# Patient Record
Sex: MALE | Race: OTHER | Employment: FULL TIME | URBAN - METROPOLITAN AREA
[De-identification: names, ages, dates, MRNs, and addresses within clinical notes are randomized per-mention and may not be internally consistent; named-entity substitution may affect disease eponyms.]

---

## 2022-02-12 ENCOUNTER — HOSPITAL ENCOUNTER (EMERGENCY)
Age: 42
Discharge: HOME | End: 2022-02-12
Attending: EMERGENCY MEDICINE

## 2022-02-12 ENCOUNTER — APPOINTMENT (OUTPATIENT)
Dept: GENERAL RADIOLOGY | Age: 42
End: 2022-02-12
Attending: EMERGENCY MEDICINE

## 2022-02-12 ENCOUNTER — APPOINTMENT (OUTPATIENT)
Dept: CT IMAGING | Age: 42
End: 2022-02-12
Attending: EMERGENCY MEDICINE

## 2022-02-12 VITALS
BODY MASS INDEX: 27 KG/M2 | HEART RATE: 88 BPM | HEIGHT: 67 IN | SYSTOLIC BLOOD PRESSURE: 109 MMHG | DIASTOLIC BLOOD PRESSURE: 62 MMHG | WEIGHT: 172 LBS | TEMPERATURE: 98 F | OXYGEN SATURATION: 100 % | RESPIRATION RATE: 16 BRPM

## 2022-02-12 DIAGNOSIS — R55 SYNCOPE AND COLLAPSE: Primary | ICD-10-CM

## 2022-02-12 DIAGNOSIS — K29.00 ACUTE GASTRITIS WITHOUT HEMORRHAGE, UNSPECIFIED GASTRITIS TYPE: ICD-10-CM

## 2022-02-12 DIAGNOSIS — E86.0 DEHYDRATION: ICD-10-CM

## 2022-02-12 DIAGNOSIS — R10.9 ACUTE ABDOMINAL PAIN: ICD-10-CM

## 2022-02-12 LAB
ALBUMIN SERPL-MCNC: 3.6 G/DL (ref 3.5–5)
ALBUMIN/GLOB SERPL: 1.1 {RATIO} (ref 1.2–3.5)
ALP SERPL-CCNC: 65 U/L (ref 50–136)
ALT SERPL-CCNC: 39 U/L (ref 12–65)
ANION GAP SERPL CALC-SCNC: 6 MMOL/L (ref 7–16)
AST SERPL-CCNC: 20 U/L (ref 15–37)
ATRIAL RATE: 73 BPM
BASOPHILS # BLD: 0.1 K/UL (ref 0–0.2)
BASOPHILS NFR BLD: 1 % (ref 0–2)
BILIRUB SERPL-MCNC: 0.4 MG/DL (ref 0.2–1.1)
BUN SERPL-MCNC: 25 MG/DL (ref 6–23)
CALCIUM SERPL-MCNC: 8.4 MG/DL (ref 8.3–10.4)
CALCULATED P AXIS, ECG09: 64 DEGREES
CALCULATED R AXIS, ECG10: 47 DEGREES
CALCULATED T AXIS, ECG11: 13 DEGREES
CHLORIDE SERPL-SCNC: 109 MMOL/L (ref 98–107)
CO2 SERPL-SCNC: 26 MMOL/L (ref 21–32)
CREAT SERPL-MCNC: 0.82 MG/DL (ref 0.8–1.5)
DIAGNOSIS, 93000: NORMAL
DIFFERENTIAL METHOD BLD: ABNORMAL
EOSINOPHIL # BLD: 0.1 K/UL (ref 0–0.8)
EOSINOPHIL NFR BLD: 1 % (ref 0.5–7.8)
ERYTHROCYTE [DISTWIDTH] IN BLOOD BY AUTOMATED COUNT: 13 % (ref 11.9–14.6)
GLOBULIN SER CALC-MCNC: 3.2 G/DL (ref 2.3–3.5)
GLUCOSE SERPL-MCNC: 137 MG/DL (ref 65–100)
HCT VFR BLD AUTO: 32.3 % (ref 41.1–50.3)
HGB BLD-MCNC: 10.9 G/DL (ref 13.6–17.2)
IMM GRANULOCYTES # BLD AUTO: 0.1 K/UL (ref 0–0.5)
IMM GRANULOCYTES NFR BLD AUTO: 1 % (ref 0–5)
LIPASE SERPL-CCNC: 193 U/L (ref 73–393)
LYMPHOCYTES # BLD: 2.8 K/UL (ref 0.5–4.6)
LYMPHOCYTES NFR BLD: 26 % (ref 13–44)
MCH RBC QN AUTO: 30.3 PG (ref 26.1–32.9)
MCHC RBC AUTO-ENTMCNC: 33.7 G/DL (ref 31.4–35)
MCV RBC AUTO: 89.7 FL (ref 79.6–97.8)
MONOCYTES # BLD: 0.7 K/UL (ref 0.1–1.3)
MONOCYTES NFR BLD: 7 % (ref 4–12)
NEUTS SEG # BLD: 7 K/UL (ref 1.7–8.2)
NEUTS SEG NFR BLD: 65 % (ref 43–78)
NRBC # BLD: 0 K/UL (ref 0–0.2)
P-R INTERVAL, ECG05: 268 MS
PLATELET # BLD AUTO: 303 K/UL (ref 150–450)
PMV BLD AUTO: 9.1 FL (ref 9.4–12.3)
POTASSIUM SERPL-SCNC: 4.3 MMOL/L (ref 3.5–5.1)
PROT SERPL-MCNC: 6.8 G/DL (ref 6.3–8.2)
Q-T INTERVAL, ECG07: 394 MS
QRS DURATION, ECG06: 86 MS
QTC CALCULATION (BEZET), ECG08: 434 MS
RBC # BLD AUTO: 3.6 M/UL (ref 4.23–5.6)
SODIUM SERPL-SCNC: 141 MMOL/L (ref 136–145)
VENTRICULAR RATE, ECG03: 73 BPM
WBC # BLD AUTO: 10.8 K/UL (ref 4.3–11.1)

## 2022-02-12 PROCEDURE — 80053 COMPREHEN METABOLIC PANEL: CPT

## 2022-02-12 PROCEDURE — 96374 THER/PROPH/DIAG INJ IV PUSH: CPT

## 2022-02-12 PROCEDURE — 94762 N-INVAS EAR/PLS OXIMTRY CONT: CPT

## 2022-02-12 PROCEDURE — 99285 EMERGENCY DEPT VISIT HI MDM: CPT

## 2022-02-12 PROCEDURE — 71045 X-RAY EXAM CHEST 1 VIEW: CPT

## 2022-02-12 PROCEDURE — 96375 TX/PRO/DX INJ NEW DRUG ADDON: CPT

## 2022-02-12 PROCEDURE — 83690 ASSAY OF LIPASE: CPT

## 2022-02-12 PROCEDURE — 70450 CT HEAD/BRAIN W/O DYE: CPT

## 2022-02-12 PROCEDURE — 74011000250 HC RX REV CODE- 250: Performed by: EMERGENCY MEDICINE

## 2022-02-12 PROCEDURE — 93005 ELECTROCARDIOGRAM TRACING: CPT | Performed by: EMERGENCY MEDICINE

## 2022-02-12 PROCEDURE — 83735 ASSAY OF MAGNESIUM: CPT

## 2022-02-12 PROCEDURE — 74011250636 HC RX REV CODE- 250/636: Performed by: EMERGENCY MEDICINE

## 2022-02-12 PROCEDURE — 85025 COMPLETE CBC W/AUTO DIFF WBC: CPT

## 2022-02-12 RX ORDER — HYDROMORPHONE HYDROCHLORIDE 1 MG/ML
0.5 INJECTION, SOLUTION INTRAMUSCULAR; INTRAVENOUS; SUBCUTANEOUS ONCE
Status: COMPLETED | OUTPATIENT
Start: 2022-02-12 | End: 2022-02-12

## 2022-02-12 RX ORDER — OMEPRAZOLE 20 MG/1
20 CAPSULE, DELAYED RELEASE ORAL DAILY
Qty: 30 CAPSULE | Refills: 2 | Status: SHIPPED | OUTPATIENT
Start: 2022-02-12 | End: 2022-02-16

## 2022-02-12 RX ORDER — ONDANSETRON 4 MG/1
4 TABLET, ORALLY DISINTEGRATING ORAL
Qty: 10 TABLET | Refills: 0 | Status: SHIPPED | OUTPATIENT
Start: 2022-02-12

## 2022-02-12 RX ORDER — SODIUM CHLORIDE 0.9 % (FLUSH) 0.9 %
5-10 SYRINGE (ML) INJECTION EVERY 8 HOURS
Status: DISCONTINUED | OUTPATIENT
Start: 2022-02-12 | End: 2022-02-12 | Stop reason: HOSPADM

## 2022-02-12 RX ORDER — ONDANSETRON 2 MG/ML
4 INJECTION INTRAMUSCULAR; INTRAVENOUS
Status: COMPLETED | OUTPATIENT
Start: 2022-02-12 | End: 2022-02-12

## 2022-02-12 RX ORDER — SODIUM CHLORIDE 0.9 % (FLUSH) 0.9 %
5-10 SYRINGE (ML) INJECTION AS NEEDED
Status: DISCONTINUED | OUTPATIENT
Start: 2022-02-12 | End: 2022-02-12 | Stop reason: HOSPADM

## 2022-02-12 RX ADMIN — SODIUM CHLORIDE 1000 ML: 900 INJECTION, SOLUTION INTRAVENOUS at 13:22

## 2022-02-12 RX ADMIN — FAMOTIDINE 20 MG: 10 INJECTION, SOLUTION INTRAVENOUS at 10:40

## 2022-02-12 RX ADMIN — HYDROMORPHONE HYDROCHLORIDE 0.5 MG: 1 INJECTION, SOLUTION INTRAMUSCULAR; INTRAVENOUS; SUBCUTANEOUS at 10:33

## 2022-02-12 RX ADMIN — SODIUM CHLORIDE 12.5 MG: 9 INJECTION INTRAMUSCULAR; INTRAVENOUS; SUBCUTANEOUS at 11:21

## 2022-02-12 RX ADMIN — ONDANSETRON 4 MG: 2 INJECTION INTRAMUSCULAR; INTRAVENOUS at 10:40

## 2022-02-12 NOTE — ED NOTES
Patient advises that he had got up this morning and went to take a shower when he started with abdominal pain then dizzy with witnessed syncopal episode. Patient advises wife helped him up and then he started sweating and has a headache. Patient also with nausea denies any vomiting at this time. Masked. Dr. Benji Anthony advised.

## 2022-02-12 NOTE — ED NOTES
I have reviewed discharge instructions with the patient and spouse. The patient and spouse verbalized understanding. Patient left ED via Discharge Method: ambulatory to Home with [facility]. Opportunity for questions and clarification provided. Patient given 2 scripts. To continue your aftercare when you leave the hospital, you may receive an automated call from our care team to check in on how you are doing. This is a free service and part of our promise to provide the best care and service to meet your aftercare needs.  If you have questions, or wish to unsubscribe from this service please call [phone]. Thank you for Choosing our [facility] Emergency Department.

## 2022-02-12 NOTE — ED PROVIDER NOTES
Per nurses notes: \"Patient advises that he had got up this morning and went to take a shower when he started with abdominal pain then dizzy with witnessed syncopal episode. Patient advises wife helped him up and then he started sweating and has a headache. Patient also with nausea denies any vomiting at this time. Masked. Dr. Jorge Moreno advised. \"    The patient had started some medication of his wife's for constipation approximately 3 days ago. He denies any melena or hematochezia. Headache is described as generalized and throbbing and denies any neck pain, visual changes, paralysis or paresthesias. Dizziness is much worse with movement and improved with sitting still. The history is provided by the patient. The history is limited by a language barrier. A  was used. Syncope   This is a new problem. The current episode started less than 1 hour ago. The problem occurs rarely. The problem has been resolved. He lost consciousness for a period of less than one minute. The problem is associated with standing up. Associated symptoms include abdominal pain, nausea, dizziness and light-headedness. Pertinent negatives include no visual change, no chest pain, no palpitations, no clumsiness, no confusion, no fever, no malaise/fatigue, no bowel incontinence, no vomiting, no bladder incontinence, no congestion, no headaches, no back pain, no focal weakness, no seizures, no slurred speech, no weakness, no melena, no anal bleeding and no head injury. He has tried bed rest for the symptoms. The treatment provided mild relief. His past medical history does not include no CVA, no TIA, no CAD, no DM, no HTN, no vertigo, no seizures, no syncope or no AICD. No past medical history on file. No past surgical history on file. No family history on file.     Social History     Socioeconomic History    Marital status:      Spouse name: Not on file    Number of children: Not on file    Years of education: Not on file    Highest education level: Not on file   Occupational History    Not on file   Tobacco Use    Smoking status: Not on file    Smokeless tobacco: Not on file   Substance and Sexual Activity    Alcohol use: Not on file    Drug use: Not on file    Sexual activity: Not on file   Other Topics Concern    Not on file   Social History Narrative    Not on file     Social Determinants of Health     Financial Resource Strain:     Difficulty of Paying Living Expenses: Not on file   Food Insecurity:     Worried About Running Out of Food in the Last Year: Not on file    Leah of Food in the Last Year: Not on file   Transportation Needs:     Lack of Transportation (Medical): Not on file    Lack of Transportation (Non-Medical): Not on file   Physical Activity:     Days of Exercise per Week: Not on file    Minutes of Exercise per Session: Not on file   Stress:     Feeling of Stress : Not on file   Social Connections:     Frequency of Communication with Friends and Family: Not on file    Frequency of Social Gatherings with Friends and Family: Not on file    Attends Yazdanism Services: Not on file    Active Member of [address] [place] [address] or Organizations: Not on file    Attends Club or Organization Meetings: Not on file    Marital Status: Not on file   Intimate Partner Violence:     Fear of Current or Ex-Partner: Not on file    Emotionally Abused: Not on file    Physically Abused: Not on file    Sexually Abused: Not on file   Housing Stability:     Unable to Pay for Housing in the Last Year: Not on file    Number of [place] in the Last Year: Not on file    Unstable Housing in the Last Year: Not on file         ALLERGIES: Patient has no known allergies. Review of Systems   Constitutional: Negative for appetite change, chills, fever and malaise/fatigue. HENT: Negative for congestion. Cardiovascular: Positive for syncope. Negative for chest pain and palpitations.    Gastrointestinal: Positive for abdominal pain, constipation and nausea. Negative for anal bleeding, blood in stool, bowel incontinence, diarrhea, melena and vomiting. Genitourinary: Negative for bladder incontinence, dysuria and frequency. Musculoskeletal: Negative for back pain. Neurological: Positive for dizziness and light-headedness. Negative for focal weakness, seizures, syncope, facial asymmetry, speech difficulty, weakness, numbness and headaches. Psychiatric/Behavioral: Negative for confusion. All other systems reviewed and are negative. Vitals:    02/12/22 1015   BP: 124/74   Pulse: 73   Resp: 16   Temp: 97.7 °F (36.5 °C)   SpO2: 100%   Weight: 78 kg (172 lb)   Height: 5' 6.93\" (1.7 m)            Physical Exam  Vitals and nursing note reviewed. Constitutional:       General: He is in acute distress (mild). Appearance: He is well-developed. HENT:      Head: Normocephalic and atraumatic. Right Ear: External ear normal.      Left Ear: External ear normal.   Eyes:      General: No scleral icterus. Extraocular Movements: Extraocular movements intact. Conjunctiva/sclera: Conjunctivae normal.      Pupils: Pupils are equal, round, and reactive to light. Comments: No evidence of nystagmus   Cardiovascular:      Rate and Rhythm: Normal rate and regular rhythm. Heart sounds: Normal heart sounds. No murmur heard. Pulmonary:      Effort: Pulmonary effort is normal.      Breath sounds: Normal breath sounds. Abdominal:      General: Abdomen is flat. Bowel sounds are normal.      Palpations: Abdomen is soft. There is no shifting dullness, hepatomegaly, splenomegaly, mass or pulsatile mass. Tenderness: There is abdominal tenderness (mild) in the epigastric area. There is no right CVA tenderness, left CVA tenderness, guarding or rebound. Negative signs include Colbert's sign and McBurney's sign. Hernia: No hernia is present.    Musculoskeletal:         General: Normal range of motion. Cervical back: Normal range of motion and neck supple. Skin:     General: Skin is warm and dry. Capillary Refill: Capillary refill takes less than 2 seconds. Neurological:      Mental Status: He is alert and oriented to person, place, and time. Cranial Nerves: Cranial nerves are intact. Sensory: Sensation is intact. Motor: Motor function is intact. Coordination: Coordination is intact. Psychiatric:         Mood and Affect: Mood normal.         Behavior: Behavior normal.          MDM  Number of Diagnoses or Management Options  Acute abdominal pain: new and requires workup  Acute gastritis without hemorrhage, unspecified gastritis type: new and requires workup  Dehydration: new and requires workup  Syncope and collapse: new and requires workup     Amount and/or Complexity of Data Reviewed  Clinical lab tests: ordered  Tests in the radiology section of CPT®: ordered  Tests in the medicine section of CPT®: ordered and reviewed (ECG interpretation for ECG dated 12 February 22 at 10:17 AM: ECG reveals normal sinus rhythm with first-degree AV block at a rate of 73 bpm, normal WA and QTc intervals with no evidence of acute ectopy or ischemia. Normal ECG except for first-degree block. Tacho Irvin MD  )  Review and summarize past medical records: yes      ED Course as of 02/12/22 1446   Sat Feb 12, 2022   1444 Patient states headache resolved and feeling much better.  [BB]      ED Course User Index  [BB] Nila Alexander MD       Procedures      Results Reviewed:      Recent Results (from the past 24 hour(s))   EKG, 12 LEAD, INITIAL    Collection Time: 02/12/22 10:17 AM   Result Value Ref Range    Ventricular Rate 73 BPM    Atrial Rate 73 BPM    P-R Interval 268 ms    QRS Duration 86 ms    Q-T Interval 394 ms    QTC Calculation (Bezet) 434 ms    Calculated P Axis 64 degrees    Calculated R Axis 47 degrees    Calculated T Axis 13 degrees    Diagnosis       Sinus rhythm with 1st degree A-V block  Otherwise normal ECG    Confirmed by Karl Mcknight ([id number]) on 2/12/2022 11:53:02 AM     CBC WITH AUTOMATED DIFF    Collection Time: 02/12/22 10:22 AM   Result Value Ref Range    WBC 10.8 4.3 - 11.1 K/uL    RBC 3.60 (L) 4.23 - 5.6 M/uL    HGB 10.9 (L) 13.6 - 17.2 g/dL    HCT 32.3 (L) 41.1 - 50.3 %    MCV 89.7 79.6 - 97.8 FL    MCH 30.3 26.1 - 32.9 PG    MCHC 33.7 31.4 - 35.0 g/dL    RDW 13.0 11.9 - 14.6 %    PLATELET 593 545 - 139 K/uL    MPV 9.1 (L) 9.4 - 12.3 FL    ABSOLUTE NRBC 0.00 0.0 - 0.2 K/uL    DF AUTOMATED      NEUTROPHILS 65 43 - 78 %    LYMPHOCYTES 26 13 - 44 %    MONOCYTES 7 4.0 - 12.0 %    EOSINOPHILS 1 0.5 - 7.8 %    BASOPHILS 1 0.0 - 2.0 %    IMMATURE GRANULOCYTES 1 0.0 - 5.0 %    ABS. NEUTROPHILS 7.0 1.7 - 8.2 K/UL    ABS. LYMPHOCYTES 2.8 0.5 - 4.6 K/UL    ABS. MONOCYTES 0.7 0.1 - 1.3 K/UL    ABS. EOSINOPHILS 0.1 0.0 - 0.8 K/UL    ABS. BASOPHILS 0.1 0.0 - 0.2 K/UL    ABS. IMM. GRANS. 0.1 0.0 - 0.5 K/UL   METABOLIC PANEL, COMPREHENSIVE    Collection Time: 02/12/22 10:22 AM   Result Value Ref Range    Sodium 141 136 - 145 mmol/L    Potassium 4.3 3.5 - 5.1 mmol/L    Chloride 109 (H) 98 - 107 mmol/L    CO2 26 21 - 32 mmol/L    Anion gap 6 (L) 7 - 16 mmol/L    Glucose 137 (H) 65 - 100 mg/dL    BUN 25 (H) 6 - 23 MG/DL    Creatinine 0.82 0.8 - 1.5 MG/DL    GFR est AA >60 >60 ml/min/1.73m2    GFR est non-AA >60 >60 ml/min/1.73m2    Calcium 8.4 8.3 - 10.4 MG/DL    Bilirubin, total 0.4 0.2 - 1.1 MG/DL    ALT (SGPT) 39 12 - 65 U/L    AST (SGOT) 20 15 - 37 U/L    Alk. phosphatase 65 50 - 136 U/L    Protein, total 6.8 6.3 - 8.2 g/dL    Albumin 3.6 3.5 - 5.0 g/dL    Globulin 3.2 2.3 - 3.5 g/dL    A-G Ratio 1.1 (L) 1.2 - 3.5     LIPASE    Collection Time: 02/12/22 10:22 AM   Result Value Ref Range    Lipase 193 73 - 393 U/L     CT HEAD WO CONT   Final Result   Tiny benign-appearing parenchymal calcifications may be vascular.       XR CHEST PORT   Final Result   Lungs very underexpanded but felt to be clear. I discussed the results of all labs, procedures, radiographs, and treatments with the patient and available family. Treatment plan is agreed upon and the patient is ready for discharge. All voiced understanding of the discharge plan and medication instructions or changes as appropriate. Questions about treatment in the ED were answered. All were encouraged to return should symptoms worsen or new problems develop.

## 2022-02-15 ENCOUNTER — HOSPITAL ENCOUNTER (INPATIENT)
Age: 42
LOS: 1 days | Discharge: HOME | DRG: 812 | End: 2022-02-16
Attending: EMERGENCY MEDICINE | Admitting: INTERNAL MEDICINE

## 2022-02-15 ENCOUNTER — ANESTHESIA EVENT (OUTPATIENT)
Dept: ENDOSCOPY | Age: 42
DRG: 812 | End: 2022-02-15

## 2022-02-15 ENCOUNTER — ANESTHESIA (OUTPATIENT)
Dept: ENDOSCOPY | Age: 42
DRG: 812 | End: 2022-02-15

## 2022-02-15 DIAGNOSIS — R55 SYNCOPE AND COLLAPSE: ICD-10-CM

## 2022-02-15 DIAGNOSIS — K92.2 ACUTE UPPER GI BLEED: Primary | ICD-10-CM

## 2022-02-15 DIAGNOSIS — D64.9 SYMPTOMATIC ANEMIA: ICD-10-CM

## 2022-02-15 PROBLEM — D62 ACUTE BLOOD LOSS ANEMIA: Status: ACTIVE | Noted: 2022-02-15

## 2022-02-15 PROBLEM — K92.1 MELENA: Status: ACTIVE | Noted: 2022-02-15

## 2022-02-15 LAB
ALBUMIN SERPL-MCNC: 3.1 G/DL (ref 3.5–5)
ALBUMIN/GLOB SERPL: 1.1 {RATIO} (ref 1.2–3.5)
ALP SERPL-CCNC: 54 U/L (ref 50–136)
ALT SERPL-CCNC: 33 U/L (ref 12–65)
ANION GAP SERPL CALC-SCNC: 3 MMOL/L (ref 7–16)
AST SERPL-CCNC: 19 U/L (ref 15–37)
BASOPHILS # BLD: 0 K/UL (ref 0–0.2)
BASOPHILS NFR BLD: 0 % (ref 0–2)
BILIRUB SERPL-MCNC: 0.2 MG/DL (ref 0.2–1.1)
BUN SERPL-MCNC: 30 MG/DL (ref 6–23)
CALCIUM SERPL-MCNC: 8.2 MG/DL (ref 8.3–10.4)
CHLORIDE SERPL-SCNC: 110 MMOL/L (ref 98–107)
CO2 SERPL-SCNC: 27 MMOL/L (ref 21–32)
COVID-19 RAPID TEST, COVR: NOT DETECTED
CREAT SERPL-MCNC: 0.86 MG/DL (ref 0.8–1.5)
DIFFERENTIAL METHOD BLD: ABNORMAL
EOSINOPHIL # BLD: 0 K/UL (ref 0–0.8)
EOSINOPHIL NFR BLD: 0 % (ref 0.5–7.8)
ERYTHROCYTE [DISTWIDTH] IN BLOOD BY AUTOMATED COUNT: 14.2 % (ref 11.9–14.6)
GLOBULIN SER CALC-MCNC: 2.9 G/DL (ref 2.3–3.5)
GLUCOSE SERPL-MCNC: 134 MG/DL (ref 65–100)
HCT VFR BLD AUTO: 22.2 % (ref 41.1–50.3)
HEMOCCULT STL QL: POSITIVE
HGB BLD-MCNC: 7.4 G/DL (ref 13.6–17.2)
HISTORY CHECKED?,CKHIST: NORMAL
IMM GRANULOCYTES # BLD AUTO: 0.3 K/UL (ref 0–0.5)
IMM GRANULOCYTES NFR BLD AUTO: 3 % (ref 0–5)
INR PPP: 1
LACTATE SERPL-SCNC: 1.3 MMOL/L (ref 0.4–2)
LIPASE SERPL-CCNC: 214 U/L (ref 73–393)
LYMPHOCYTES # BLD: 2 K/UL (ref 0.5–4.6)
LYMPHOCYTES NFR BLD: 17 % (ref 13–44)
MAGNESIUM SERPL-MCNC: 1.9 MG/DL (ref 1.6–2.3)
MAGNESIUM SERPL-MCNC: 2.2 MG/DL (ref 1.8–2.4)
MCH RBC QN AUTO: 31.1 PG (ref 26.1–32.9)
MCHC RBC AUTO-ENTMCNC: 33.3 G/DL (ref 31.4–35)
MCV RBC AUTO: 93.3 FL (ref 79.6–97.8)
MONOCYTES # BLD: 0.7 K/UL (ref 0.1–1.3)
MONOCYTES NFR BLD: 6 % (ref 4–12)
NEUTS SEG # BLD: 8.4 K/UL (ref 1.7–8.2)
NEUTS SEG NFR BLD: 73 % (ref 43–78)
NRBC # BLD: 0.02 K/UL (ref 0–0.2)
PLATELET # BLD AUTO: 289 K/UL (ref 150–450)
PMV BLD AUTO: 9.1 FL (ref 9.4–12.3)
POTASSIUM SERPL-SCNC: 4.3 MMOL/L (ref 3.5–5.1)
PROT SERPL-MCNC: 6 G/DL (ref 6.3–8.2)
PROTHROMBIN TIME: 13.6 SEC (ref 12.6–14.5)
RBC # BLD AUTO: 2.38 M/UL (ref 4.23–5.6)
SODIUM SERPL-SCNC: 140 MMOL/L (ref 136–145)
SOURCE, COVRS: NORMAL
WBC # BLD AUTO: 11.4 K/UL (ref 4.3–11.1)

## 2022-02-15 PROCEDURE — 82270 OCCULT BLOOD FECES: CPT

## 2022-02-15 PROCEDURE — 93005 ELECTROCARDIOGRAM TRACING: CPT | Performed by: EMERGENCY MEDICINE

## 2022-02-15 PROCEDURE — C9113 INJ PANTOPRAZOLE SODIUM, VIA: HCPCS | Performed by: EMERGENCY MEDICINE

## 2022-02-15 PROCEDURE — 77030040361 HC SLV COMPR DVT MDII -B

## 2022-02-15 PROCEDURE — 76040000025: Performed by: INTERNAL MEDICINE

## 2022-02-15 PROCEDURE — 83735 ASSAY OF MAGNESIUM: CPT

## 2022-02-15 PROCEDURE — 74011250636 HC RX REV CODE- 250/636: Performed by: NURSE ANESTHETIST, CERTIFIED REGISTERED

## 2022-02-15 PROCEDURE — 99284 EMERGENCY DEPT VISIT MOD MDM: CPT

## 2022-02-15 PROCEDURE — 86923 COMPATIBILITY TEST ELECTRIC: CPT

## 2022-02-15 PROCEDURE — 96374 THER/PROPH/DIAG INJ IV PUSH: CPT

## 2022-02-15 PROCEDURE — 65270000029 HC RM PRIVATE

## 2022-02-15 PROCEDURE — 83690 ASSAY OF LIPASE: CPT

## 2022-02-15 PROCEDURE — 2709999900 HC NON-CHARGEABLE SUPPLY: Performed by: INTERNAL MEDICINE

## 2022-02-15 PROCEDURE — 74011000250 HC RX REV CODE- 250: Performed by: EMERGENCY MEDICINE

## 2022-02-15 PROCEDURE — 96361 HYDRATE IV INFUSION ADD-ON: CPT

## 2022-02-15 PROCEDURE — 36430 TRANSFUSION BLD/BLD COMPNT: CPT

## 2022-02-15 PROCEDURE — 83605 ASSAY OF LACTIC ACID: CPT

## 2022-02-15 PROCEDURE — 85610 PROTHROMBIN TIME: CPT

## 2022-02-15 PROCEDURE — 85025 COMPLETE CBC W/AUTO DIFF WBC: CPT

## 2022-02-15 PROCEDURE — 2709999900 HC NON-CHARGEABLE SUPPLY

## 2022-02-15 PROCEDURE — 80053 COMPREHEN METABOLIC PANEL: CPT

## 2022-02-15 PROCEDURE — 0DJ08ZZ INSPECTION OF UPPER INTESTINAL TRACT, VIA NATURAL OR ARTIFICIAL OPENING ENDOSCOPIC: ICD-10-PCS | Performed by: INTERNAL MEDICINE

## 2022-02-15 PROCEDURE — 74011250637 HC RX REV CODE- 250/637: Performed by: PHYSICIAN ASSISTANT

## 2022-02-15 PROCEDURE — 74011250636 HC RX REV CODE- 250/636: Performed by: INTERNAL MEDICINE

## 2022-02-15 PROCEDURE — 93005 ELECTROCARDIOGRAM TRACING: CPT | Performed by: STUDENT IN AN ORGANIZED HEALTH CARE EDUCATION/TRAINING PROGRAM

## 2022-02-15 PROCEDURE — 87635 SARS-COV-2 COVID-19 AMP PRB: CPT

## 2022-02-15 PROCEDURE — P9016 RBC LEUKOCYTES REDUCED: HCPCS

## 2022-02-15 PROCEDURE — 74011250636 HC RX REV CODE- 250/636: Performed by: EMERGENCY MEDICINE

## 2022-02-15 PROCEDURE — 76060000031 HC ANESTHESIA FIRST 0.5 HR: Performed by: INTERNAL MEDICINE

## 2022-02-15 PROCEDURE — 86900 BLOOD TYPING SEROLOGIC ABO: CPT

## 2022-02-15 PROCEDURE — C9113 INJ PANTOPRAZOLE SODIUM, VIA: HCPCS | Performed by: INTERNAL MEDICINE

## 2022-02-15 PROCEDURE — 74011000250 HC RX REV CODE- 250: Performed by: INTERNAL MEDICINE

## 2022-02-15 RX ORDER — FENTANYL CITRATE 50 UG/ML
100 INJECTION, SOLUTION INTRAMUSCULAR; INTRAVENOUS ONCE
Status: CANCELLED | OUTPATIENT
Start: 2022-02-15 | End: 2022-02-15

## 2022-02-15 RX ORDER — ONDANSETRON 2 MG/ML
4 INJECTION INTRAMUSCULAR; INTRAVENOUS ONCE
Status: DISCONTINUED | OUTPATIENT
Start: 2022-02-15 | End: 2022-02-15 | Stop reason: HOSPADM

## 2022-02-15 RX ORDER — POLYETHYLENE GLYCOL 3350 17 G/17G
238 POWDER, FOR SOLUTION ORAL ONCE
Status: COMPLETED | OUTPATIENT
Start: 2022-02-15 | End: 2022-02-15

## 2022-02-15 RX ORDER — SODIUM CHLORIDE 9 MG/ML
125 INJECTION, SOLUTION INTRAVENOUS CONTINUOUS
Status: DISCONTINUED | OUTPATIENT
Start: 2022-02-15 | End: 2022-02-15

## 2022-02-15 RX ORDER — MIDAZOLAM HYDROCHLORIDE 1 MG/ML
2 INJECTION, SOLUTION INTRAMUSCULAR; INTRAVENOUS ONCE
Status: CANCELLED | OUTPATIENT
Start: 2022-02-15 | End: 2022-02-15

## 2022-02-15 RX ORDER — PROPOFOL 10 MG/ML
INJECTION, EMULSION INTRAVENOUS AS NEEDED
Status: DISCONTINUED | OUTPATIENT
Start: 2022-02-15 | End: 2022-02-15 | Stop reason: HOSPADM

## 2022-02-15 RX ORDER — SODIUM CHLORIDE 0.9 % (FLUSH) 0.9 %
5-10 SYRINGE (ML) INJECTION AS NEEDED
Status: DISCONTINUED | OUTPATIENT
Start: 2022-02-15 | End: 2022-02-16 | Stop reason: HOSPADM

## 2022-02-15 RX ORDER — OXYCODONE HYDROCHLORIDE 5 MG/1
5 TABLET ORAL
Status: DISCONTINUED | OUTPATIENT
Start: 2022-02-15 | End: 2022-02-15 | Stop reason: HOSPADM

## 2022-02-15 RX ORDER — OXYCODONE HYDROCHLORIDE 5 MG/1
10 TABLET ORAL
Status: DISCONTINUED | OUTPATIENT
Start: 2022-02-15 | End: 2022-02-15 | Stop reason: HOSPADM

## 2022-02-15 RX ORDER — SODIUM CHLORIDE, SODIUM LACTATE, POTASSIUM CHLORIDE, CALCIUM CHLORIDE 600; 310; 30; 20 MG/100ML; MG/100ML; MG/100ML; MG/100ML
100 INJECTION, SOLUTION INTRAVENOUS CONTINUOUS
Status: DISCONTINUED | OUTPATIENT
Start: 2022-02-15 | End: 2022-02-15 | Stop reason: HOSPADM

## 2022-02-15 RX ORDER — SODIUM CHLORIDE, SODIUM LACTATE, POTASSIUM CHLORIDE, CALCIUM CHLORIDE 600; 310; 30; 20 MG/100ML; MG/100ML; MG/100ML; MG/100ML
100 INJECTION, SOLUTION INTRAVENOUS CONTINUOUS
Status: CANCELLED | OUTPATIENT
Start: 2022-02-15 | End: 2022-02-16

## 2022-02-15 RX ORDER — BISACODYL 5 MG
20 TABLET, DELAYED RELEASE (ENTERIC COATED) ORAL
Status: COMPLETED | OUTPATIENT
Start: 2022-02-15 | End: 2022-02-15

## 2022-02-15 RX ORDER — HYDROMORPHONE HYDROCHLORIDE 2 MG/ML
0.5 INJECTION, SOLUTION INTRAMUSCULAR; INTRAVENOUS; SUBCUTANEOUS
Status: DISCONTINUED | OUTPATIENT
Start: 2022-02-15 | End: 2022-02-15 | Stop reason: HOSPADM

## 2022-02-15 RX ORDER — NALOXONE HYDROCHLORIDE 0.4 MG/ML
0.1 INJECTION, SOLUTION INTRAMUSCULAR; INTRAVENOUS; SUBCUTANEOUS AS NEEDED
Status: DISCONTINUED | OUTPATIENT
Start: 2022-02-15 | End: 2022-02-15 | Stop reason: HOSPADM

## 2022-02-15 RX ORDER — SODIUM CHLORIDE 0.9 % (FLUSH) 0.9 %
5-40 SYRINGE (ML) INJECTION EVERY 8 HOURS
Status: DISCONTINUED | OUTPATIENT
Start: 2022-02-15 | End: 2022-02-16 | Stop reason: HOSPADM

## 2022-02-15 RX ORDER — DIPHENHYDRAMINE HYDROCHLORIDE 50 MG/ML
12.5 INJECTION, SOLUTION INTRAMUSCULAR; INTRAVENOUS
Status: DISCONTINUED | OUTPATIENT
Start: 2022-02-15 | End: 2022-02-15 | Stop reason: HOSPADM

## 2022-02-15 RX ORDER — SODIUM CHLORIDE 9 MG/ML
250 INJECTION, SOLUTION INTRAVENOUS AS NEEDED
Status: DISCONTINUED | OUTPATIENT
Start: 2022-02-15 | End: 2022-02-16 | Stop reason: HOSPADM

## 2022-02-15 RX ORDER — SODIUM CHLORIDE 0.9 % (FLUSH) 0.9 %
5-10 SYRINGE (ML) INJECTION EVERY 8 HOURS
Status: DISCONTINUED | OUTPATIENT
Start: 2022-02-15 | End: 2022-02-16 | Stop reason: HOSPADM

## 2022-02-15 RX ORDER — ALBUTEROL SULFATE 0.83 MG/ML
2.5 SOLUTION RESPIRATORY (INHALATION) AS NEEDED
Status: DISCONTINUED | OUTPATIENT
Start: 2022-02-15 | End: 2022-02-15 | Stop reason: HOSPADM

## 2022-02-15 RX ORDER — SODIUM CHLORIDE 0.9 % (FLUSH) 0.9 %
5-40 SYRINGE (ML) INJECTION AS NEEDED
Status: DISCONTINUED | OUTPATIENT
Start: 2022-02-15 | End: 2022-02-16 | Stop reason: HOSPADM

## 2022-02-15 RX ORDER — LIDOCAINE HYDROCHLORIDE 10 MG/ML
0.1 INJECTION INFILTRATION; PERINEURAL AS NEEDED
Status: CANCELLED | OUTPATIENT
Start: 2022-02-15

## 2022-02-15 RX ORDER — MIDAZOLAM HYDROCHLORIDE 1 MG/ML
2 INJECTION, SOLUTION INTRAMUSCULAR; INTRAVENOUS
Status: CANCELLED | OUTPATIENT
Start: 2022-02-15 | End: 2022-02-16

## 2022-02-15 RX ORDER — ONDANSETRON 2 MG/ML
4 INJECTION INTRAMUSCULAR; INTRAVENOUS
Status: DISCONTINUED | OUTPATIENT
Start: 2022-02-15 | End: 2022-02-16 | Stop reason: HOSPADM

## 2022-02-15 RX ORDER — SODIUM CHLORIDE, SODIUM LACTATE, POTASSIUM CHLORIDE, CALCIUM CHLORIDE 600; 310; 30; 20 MG/100ML; MG/100ML; MG/100ML; MG/100ML
INJECTION, SOLUTION INTRAVENOUS
Status: DISCONTINUED | OUTPATIENT
Start: 2022-02-15 | End: 2022-02-15 | Stop reason: HOSPADM

## 2022-02-15 RX ADMIN — SODIUM CHLORIDE 40 MG: 9 INJECTION, SOLUTION INTRAMUSCULAR; INTRAVENOUS; SUBCUTANEOUS at 07:46

## 2022-02-15 RX ADMIN — PROPOFOL 40 MG: 10 INJECTION, EMULSION INTRAVENOUS at 11:58

## 2022-02-15 RX ADMIN — Medication 238 G: at 18:00

## 2022-02-15 RX ADMIN — PROPOFOL 50 MG: 10 INJECTION, EMULSION INTRAVENOUS at 12:02

## 2022-02-15 RX ADMIN — SODIUM CHLORIDE, PRESERVATIVE FREE 40 MG: 5 INJECTION INTRAVENOUS at 20:30

## 2022-02-15 RX ADMIN — SODIUM CHLORIDE, SODIUM LACTATE, POTASSIUM CHLORIDE, AND CALCIUM CHLORIDE: 600; 310; 30; 20 INJECTION, SOLUTION INTRAVENOUS at 11:45

## 2022-02-15 RX ADMIN — SODIUM CHLORIDE 1000 ML: 900 INJECTION, SOLUTION INTRAVENOUS at 07:26

## 2022-02-15 RX ADMIN — PROPOFOL 60 MG: 10 INJECTION, EMULSION INTRAVENOUS at 11:56

## 2022-02-15 RX ADMIN — PROPOFOL 70 MG: 10 INJECTION, EMULSION INTRAVENOUS at 11:59

## 2022-02-15 RX ADMIN — BISACODYL 20 MG: 5 TABLET, COATED ORAL at 16:35

## 2022-02-15 RX ADMIN — PROPOFOL 30 MG: 10 INJECTION, EMULSION INTRAVENOUS at 12:00

## 2022-02-15 NOTE — ANESTHESIA POSTPROCEDURE EVALUATION
Procedure(s):  ESOPHAGOGASTRODUODENOSCOPY (EGD).     general    Anesthesia Post Evaluation      Multimodal analgesia: multimodal analgesia used between 6 hours prior to anesthesia start to PACU discharge  Patient location during evaluation: PACU  Patient participation: complete - patient participated  Level of consciousness: awake and alert  Pain score: 0  Pain management: satisfactory to patient  Airway patency: patent  Anesthetic complications: no  Cardiovascular status: acceptable and hemodynamically stable  Respiratory status: acceptable and spontaneous ventilation  Hydration status: acceptable  Post anesthesia nausea and vomiting:  none  Final Post Anesthesia Temperature Assessment:  Normothermia (36.0-37.5 degrees C)      INITIAL Post-op Vital signs:   Vitals Value Taken Time   /70 02/15/22 1249   Temp 37 °C (98.6 °F) 02/15/22 1209   Pulse 72 02/15/22 1249   Resp 16 02/15/22 1249   SpO2 99 % 02/15/22 1249

## 2022-02-15 NOTE — ED PROVIDER NOTES
[facility] Sakshi Francis is a 35 y.o. male seen on 2/15/2022 in the [facility] EMERGENCY DEPT in room FT10/10. Chief Complaint   Patient presents with    Dizziness    Melena         HPI: 64-year-old  male presented back to the emergency department with complaints of continued lightheadedness dizziness with melanotic stools. Patient was seen in the emergency department 3 days ago for the same. Patient presented to the ER at that time after having a syncopal episode. Patient has been having intermittent right mid abdominal pain as well. Patient had work-up performed 3 days ago which was reassuring. Patient states that he was not having dark stools when he came in on Saturday however those have developed. Patient has had 2 more episodes of passing out. He had constipation last week but this has resolved after taking some medicine for constipation. He states that when he stands up he gets lightheaded and dizzy. He has noticed his dark stools as well. He denies any excessive alcohol use or NSAID use. He states that he only has a \"little bit of abdominal pain. \". He denies any fevers, chills, nausea, vomiting, chest pain, shortness of breath or any other concerns. Patient was given omeprazole and Zofran as treatment for suspected gastritis during his last visit to the emergency department. Historian: Patient/previous medical record    REVIEW OF SYSTEMS     Review of Systems   Constitutional: Positive for fatigue. HENT: Negative. Respiratory: Negative. Cardiovascular: Negative. Gastrointestinal: Positive for abdominal pain and nausea. Genitourinary: Negative. Musculoskeletal: Negative. Skin: Negative. Neurological: Positive for dizziness, syncope and light-headedness. Psychiatric/Behavioral: Negative. All other systems reviewed and are negative. PAST MEDICAL HISTORY     No past medical history on file.   No past surgical history on file. Social History     Socioeconomic History    Marital status:      Prior to Admission Medications   Prescriptions Last Dose Informant Patient Reported? Taking?   omeprazole (PRILOSEC) 20 mg capsule 2/14/2022 at Unknown time  No Yes   Sig: Take 1 Capsule by mouth daily. ondansetron (ZOFRAN ODT) 4 mg disintegrating tablet   No No   Sig: Take 1 Tablet by mouth every eight (8) hours as needed for Nausea or Vomiting. Facility-Administered Medications: None     No Known Allergies     PHYSICAL EXAM       Vitals:    02/15/22 0625 02/15/22 0626   BP: 122/66    Pulse:  84   Resp:  18   Temp:  97.6 °F (36.4 °C)   SpO2:  100%    Vital signs were reviewed. Physical Exam  Vitals and nursing note reviewed. HENT:      Head: Normocephalic and atraumatic. Nose: Nose normal.      Mouth/Throat:      Mouth: Mucous membranes are dry. Eyes:      Extraocular Movements: Extraocular movements intact. Comments: Pale conjunctiva   Cardiovascular:      Rate and Rhythm: Normal rate and regular rhythm. Pulses: Normal pulses. Heart sounds: Normal heart sounds. Pulmonary:      Effort: Pulmonary effort is normal.      Breath sounds: Normal breath sounds. Abdominal:      Palpations: Abdomen is soft. Tenderness: There is abdominal tenderness. There is no guarding or rebound. Comments: Nonfocal right-sided abdominal tenderness palpation   Genitourinary:     Rectum: Guaiac result positive. Musculoskeletal:         General: Normal range of motion. Cervical back: Normal range of motion. Skin:     General: Skin is warm. Neurological:      General: No focal deficit present. Mental Status: He is alert and oriented to person, place, and time. Psychiatric:         Mood and Affect: Mood normal.         Behavior: Behavior normal.         Thought Content:  Thought content normal.         Judgment: Judgment normal.          MEDICAL DECISION MAKING     ED Course: Orders Placed This Encounter    CBC with Diff    CMP    Lipase    PROTHROMBIN TIME + INR    LACTIC ACID    MAGNESIUM    DIET NPO    POC Urine Dipstick    B/P LYING/SIT/STANDING    TRANSFUSE PACKED RBC'S, 1 Units    CRITICAL CARE (ASAP ONLY)    POC FECAL OCCULT BLOOD    EKG (Check If Upper Abdominal Pain or symptoms of SOB, Diaphoresis, or Tachycardia)    TYPE & SCREEN    RBC, ALLOCATE, 1 Units Date needed for transfusion: 2/15/2022    SALINE LOCK IV ONE TIME Routine    sodium chloride (NS) flush 5-10 mL    sodium chloride (NS) flush 5-10 mL    sodium chloride 0.9 % bolus infusion 1,000 mL    pantoprazole (PROTONIX) 40 mg in 0.9% sodium chloride 10 mL injection    0.9% sodium chloride infusion 250 mL     Recent Results (from the past 8 hour(s))   CBC WITH AUTOMATED DIFF    Collection Time: 02/15/22  6:50 AM   Result Value Ref Range    WBC 11.4 (H) 4.3 - 11.1 K/uL    RBC 2.38 (L) 4.23 - 5.6 M/uL    HGB 7.4 (L) 13.6 - 17.2 g/dL    HCT 22.2 (L) 41.1 - 50.3 %    MCV 93.3 79.6 - 97.8 FL    MCH 31.1 26.1 - 32.9 PG    MCHC 33.3 31.4 - 35.0 g/dL    RDW 14.2 11.9 - 14.6 %    PLATELET 532 555 - 604 K/uL    MPV 9.1 (L) 9.4 - 12.3 FL    ABSOLUTE NRBC 0.02 0.0 - 0.2 K/uL    DF AUTOMATED      NEUTROPHILS 73 43 - 78 %    LYMPHOCYTES 17 13 - 44 %    MONOCYTES 6 4.0 - 12.0 %    EOSINOPHILS 0 (L) 0.5 - 7.8 %    BASOPHILS 0 0.0 - 2.0 %    IMMATURE GRANULOCYTES 3 0.0 - 5.0 %    ABS. NEUTROPHILS 8.4 (H) 1.7 - 8.2 K/UL    ABS. LYMPHOCYTES 2.0 0.5 - 4.6 K/UL    ABS. MONOCYTES 0.7 0.1 - 1.3 K/UL    ABS. EOSINOPHILS 0.0 0.0 - 0.8 K/UL    ABS. BASOPHILS 0.0 0.0 - 0.2 K/UL    ABS. IMM.  GRANS. 0.3 0.0 - 0.5 K/UL   METABOLIC PANEL, COMPREHENSIVE    Collection Time: 02/15/22  6:50 AM   Result Value Ref Range    Sodium 140 136 - 145 mmol/L    Potassium 4.3 3.5 - 5.1 mmol/L    Chloride 110 (H) 98 - 107 mmol/L    CO2 27 21 - 32 mmol/L    Anion gap 3 (L) 7 - 16 mmol/L    Glucose 134 (H) 65 - 100 mg/dL    BUN 30 (H) 6 - 23 MG/DL Creatinine 0.86 0.8 - 1.5 MG/DL    GFR est AA >60 >60 ml/min/1.73m2    GFR est non-AA >60 >60 ml/min/1.73m2    Calcium 8.2 (L) 8.3 - 10.4 MG/DL    Bilirubin, total 0.2 0.2 - 1.1 MG/DL    ALT (SGPT) 33 12 - 65 U/L    AST (SGOT) 19 15 - 37 U/L    Alk. phosphatase 54 50 - 136 U/L    Protein, total 6.0 (L) 6.3 - 8.2 g/dL    Albumin 3.1 (L) 3.5 - 5.0 g/dL    Globulin 2.9 2.3 - 3.5 g/dL    A-G Ratio 1.1 (L) 1.2 - 3.5     LIPASE    Collection Time: 02/15/22  6:50 AM   Result Value Ref Range    Lipase 214 73 - 393 U/L   TYPE & SCREEN    Collection Time: 02/15/22  7:19 AM   Result Value Ref Range    Crossmatch Expiration 02/18/2022,2359     ABO/Rh(D) PENDING     Antibody screen PENDING    PROTHROMBIN TIME + INR    Collection Time: 02/15/22  7:19 AM   Result Value Ref Range    Prothrombin time 13.6 12.6 - 14.5 sec    INR 1.0     LACTIC ACID    Collection Time: 02/15/22  7:19 AM   Result Value Ref Range    Lactic acid 1.3 0.4 - 2.0 MMOL/L   POC FECAL OCCULT BLOOD    Collection Time: 02/15/22  7:31 AM   Result Value Ref Range    Occult blood, stool (POC) Positive (A) NEG       No results found. ED Course as of 02/15/22 0811   Tue Feb 15, 2022   8638 Patient is Hemoccult positive with a drop in hemoglobin from 10.9-7.4. Protonix ordered. Patient typed and screened as well. [JL]   0803 Discussed with GI and they will see in consult. [JL]   0805 Due to patient's precipitous drop in hemoglobin and significant symptomatology of multiple syncopal episodes, patient will be transfused in the emergency department. [JL]      ED Course User Index  [JL] [facility],      MDM  Number of Diagnoses or Management Options  Diagnosis management comments: 68-year-old  male presented to the emergency department for second evaluation for multiple syncopal episodes. Patient developed melanotic stools after his first visit to the emergency department. Patient's hemoglobin did drop 3 and half grams in the past 3 days. Patient is Hemoccult positive with melena. Due to patient's significant symptomatic anemia, will transfuse patient. Discussed with GI and they will see in consult. Patient is agreeable to admission and transfusion. Patient will be admitted to the hospitalist for further treatment and evaluation. Amount and/or Complexity of Data Reviewed  Clinical lab tests: ordered and reviewed  Decide to obtain previous medical records or to obtain history from someone other than the patient: yes  Obtain history from someone other than the patient: yes  Review and summarize past medical records: yes  Discuss the patient with other providers: yes  Independent visualization of images, tracings, or specimens: yes    Patient Progress  Patient progress: stable    Critical Care  Performed by: Essence Mcdonald DO  Authorized by: Essence Mcdonald DO     Critical care provider statement:     Critical care time (minutes):  36    Critical care was necessary to treat or prevent imminent or life-threatening deterioration of the following conditions:  Circulatory failure (Upper GI bleed symptomatic anemia)    Critical care was time spent personally by me on the following activities:  Blood draw for specimens, development of treatment plan with patient or surrogate, discussions with consultants, evaluation of patient's response to treatment, examination of patient, obtaining history from patient or surrogate, ordering and performing treatments and interventions, ordering and review of laboratory studies, re-evaluation of patient's condition and review of old charts  Comments:      Upper GI bleed with multiple syncopal episodes and symptomatic anemia with significant drop in hemoglobin requiring transfusion in the emergency department. Also discussed with consultants and admitting team.        Disposition: Admitted  Diagnosis:     ICD-10-CM ICD-9-CM   1. Acute upper GI bleed  K92.2 578.9   2. Symptomatic anemia  D64.9 285.9   3. Syncope and collapse  R55 780.2     ____________________________________________________________________  A portion of this note was generated using voice recognition dictation software. While the note has been reviewed for accuracy, please note certain words and phrases may not be transcribed as intended and some grammatical and/or typographical errors may be present.

## 2022-02-15 NOTE — PROGRESS NOTES
Unsuccessful attempt to contact the patient as no one answered the phone this time. I will try again another time. I was able to round on his spouse, Ms. Tracy Rodriguez who stated that communication with staff was fine while she was there.            Thank you,     [address]  [facility]  [phone] (phone)

## 2022-02-15 NOTE — PROGRESS NOTES
TRANSFER - IN REPORT:    Verbal report received from Judith Mc RN(name) on Melba Shah5  being received from [facility]) for routine post - op      Report consisted of patients Situation, Background, Assessment and   Recommendations(SBAR). Information from the following report(s) SBAR and ED Summary was reviewed with the receiving nurse. Opportunity for questions and clarification was provided.

## 2022-02-15 NOTE — H&P (VIEW-ONLY)
[facility] Consult Note       Primary GI Physician: Erma Villegas    Referring Provider:  Dr. Keith Adair, ER MD    Consult Date:  2/15/2022    Admit Date:  2/15/2022    Chief Complaint:  Syncope, ABLA, melena    Subjective:     History of Present Illness:  Patient is a 35 y.o. male with no significant PMH, who is seen in consultation at the request of Dr. Ginny Francis MD for syncope, ABLA, melena. Over the past week he has been having increased burping feeling like burning oil and black stools off and on with 1-2 BMs a day, with looser stools or harder stools. He has noticed increasing fatigue, headache and dizziness. Saturday, he had an episode of acute onset passing out with associated nausea and vomiting with black emesis and constant mild right sided abd pressure pain, non-radiating. He was seen in the ER and hgb 10.9, BUN 25. He was treated with Zofran and Omeprazole for possible gastritis and recommended follow up. He was not seen by GI at that time. This morning, he had another episode of nausea and small amount of vomiting with black emesis, and has passed out 2 times. He presented back to the ER due to his persistent symptoms and increased dizziness. Labs noted decrease in hgb to 7.4 and BUN increased to 30. He had guaiac positive stool on exam with ER MD.  He has not had similar symptoms in the past.  He denies any red bloody stools or emesis and denies any history of heartburn, chest pain, chronic cough, or difficulty swallowing. He denies any NSAID use, tobacco use, ETOH use, or drug use. He has not had a prior EGD or colonoscopy. PMH:  No significant PMH. PSH:  No past surgical history on file. Allergies:  No Known Allergies    Home Medications:  Prior to Admission medications    Medication Sig Start Date End Date Taking? Authorizing Provider   omeprazole (PRILOSEC) 20 mg capsule Take 1 Capsule by mouth daily.  2/12/22  Yes Niyah Reynolds MD   ondansetron (ZOFRAN ODT) 4 mg disintegrating tablet Take 1 Tablet by mouth every eight (8) hours as needed for Nausea or Vomiting. 2/12/22   Harris Ramon MD       Hospital Medications:  Current Facility-Administered Medications   Medication Dose Route Frequency    sodium chloride (NS) flush 5-10 mL  5-10 mL IntraVENous Q8H    sodium chloride (NS) flush 5-10 mL  5-10 mL IntraVENous PRN    0.9% sodium chloride infusion 250 mL  250 mL IntraVENous PRN     Current Outpatient Medications   Medication Sig    omeprazole (PRILOSEC) 20 mg capsule Take 1 Capsule by mouth daily.  ondansetron (ZOFRAN ODT) 4 mg disintegrating tablet Take 1 Tablet by mouth every eight (8) hours as needed for Nausea or Vomiting. Social History:  Social History     Tobacco Use    Smoking status: Not on file    Smokeless tobacco: Not on file   Substance Use Topics    Alcohol use: Not on file       Pt denies any history of tobacco, ETOH, or drug use. Family History:  No family history on file. Review of Systems:  A detailed 10 system ROS is obtained, with pertinent positives as listed above. All others are negative. Objective:     Physical Exam:  Vitals:  Visit Vitals  /66   Pulse 84   Temp 97.6 °F (36.4 °C)   Resp 18   SpO2 100%     Gen:  Pt is alert, cooperative, no acute distress, lying in bed, face mask in place. Skin:  Extremities and face reveal no rashes. HEENT: Sclerae anicteric. Extra-occular muscles are intact. No oral ulcers. No abnormal pigmentation of the lips. The neck is supple. Cardiovascular: Regular rate and rhythm. No murmurs, gallops, or rubs. Respiratory:  Comfortable breathing with no accessory muscle use. Clear breath sounds anteriorly with no wheezes, rales, or rhonchi. GI:  Abdomen nondistended, soft, and nontender. Normal active bowel sounds. No enlargement of the liver or spleen. No masses palpable. Rectal:  Deferred  Musculoskeletal:  No pitting edema of the lower legs.     Neurological: Gross memory appears intact. Patient is alert and oriented. Psychiatric:  Mood appears appropriate with judgement intact. Lymphatic:  No cervical or supraclavicular adenopathy. Laboratory:    Recent Labs     02/15/22  0719 02/15/22  0650 02/12/22  1022   WBC  --  11.4* 10.8   HGB  --  7.4* 10.9*   HCT  --  22.2* 32.3*   PLT  --  289 303   MCV  --  93.3 89.7   NA  --  140 141   K  --  4.3 4.3   CL  --  110* 109*   CO2  --  27 26   BUN  --  30* 25*   CREA  --  0.86 0.82   CA  --  8.2* 8.4   GLU  --  134* 137*   AP  --  54 65   AST  --  19 20   ALT  --  33 39   TBILI  --  0.2 0.4   ALB  --  3.1* 3.6   TP  --  6.0* 6.8   LPSE  --  214 193   PTP 13.6  --   --    INR 1.0  --   --       Ref. Range 2/15/2022 07:19   Lactic acid Latest Ref Range: 0.4 - 2.0 MMOL/L 1.3      Ref. Range 2/15/2022 07:31   Occult blood, stool (POC) Latest Ref Range: NEG   Positive (A)     Assessment:     Active Problems:    Acute blood loss anemia (2/15/2022)    34 yo male, now pt of Dr. Josie Burroughs. with no significant PMH, who is seen in consultation 15 Feb 2022 at the request of Dr. Garnell Harada, ER MD for syncope, ABLA, melena, who has been having a week of black stools, fatigue, dizziness, and burping, and has begun having right sided abd pain, nausea, vomiting with black emesis, and syncopal episodes. Hgb has decreased from 10.9 to 7.4 and increased BUN 25 to 30, with guaiac positive stool. DDx included inflammation, ulceration, AVMs. Plan:     - Supportive care, IVF.  - EGD today with Dr. Josie Burroughs.  Discussed risks including but not limited to bleeding, perforation, acute cardiopulmonary event, sedation risks, IV complications, aspiration, and pt states understanding and agrees to proceed. - NPO.  - Protonix 40 mg IV Q12 hrs. - Follow. Patient is seen and examined in collaboration with Dr. Jimmy Braun.  Assessment and plan as per Dr. Hyun Etienne.  Ramesh Cesars  [facility]

## 2022-02-15 NOTE — CONSULTS
[facility] Consult Note       Primary GI Physician: Zac Rowley    Referring Provider:  Dr. Aida Rodas, ER MD    Consult Date:  2/15/2022    Admit Date:  2/15/2022    Chief Complaint:  Syncope, ABLA, melena    Subjective:     History of Present Illness:  Patient is a 35 y.o. male with no significant PMH, who is seen in consultation at the request of Dr. Ricardo Eden MD for syncope, ABLA, melena. Over the past week he has been having increased burping feeling like burning oil and black stools off and on with 1-2 BMs a day, with looser stools or harder stools. He has noticed increasing fatigue, headache and dizziness. Saturday, he had an episode of acute onset passing out with associated nausea and vomiting with black emesis and constant mild right sided abd pressure pain, non-radiating. He was seen in the ER and hgb 10.9, BUN 25. He was treated with Zofran and Omeprazole for possible gastritis and recommended follow up. He was not seen by GI at that time. This morning, he had another episode of nausea and small amount of vomiting with black emesis, and has passed out 2 times. He presented back to the ER due to his persistent symptoms and increased dizziness. Labs noted decrease in hgb to 7.4 and BUN increased to 30. He had guaiac positive stool on exam with ER MD.  He has not had similar symptoms in the past.  He denies any red bloody stools or emesis and denies any history of heartburn, chest pain, chronic cough, or difficulty swallowing. He denies any NSAID use, tobacco use, ETOH use, or drug use. He has not had a prior EGD or colonoscopy. PMH:  No significant PMH. PSH:  No past surgical history on file. Allergies:  No Known Allergies    Home Medications:  Prior to Admission medications    Medication Sig Start Date End Date Taking? Authorizing Provider   omeprazole (PRILOSEC) 20 mg capsule Take 1 Capsule by mouth daily.  2/12/22  Yes Sanam Denis MD   ondansetron (ZOFRAN ODT) 4 mg disintegrating tablet Take 1 Tablet by mouth every eight (8) hours as needed for Nausea or Vomiting. 2/12/22   Naima Quiros MD       Hospital Medications:  Current Facility-Administered Medications   Medication Dose Route Frequency    sodium chloride (NS) flush 5-10 mL  5-10 mL IntraVENous Q8H    sodium chloride (NS) flush 5-10 mL  5-10 mL IntraVENous PRN    0.9% sodium chloride infusion 250 mL  250 mL IntraVENous PRN     Current Outpatient Medications   Medication Sig    omeprazole (PRILOSEC) 20 mg capsule Take 1 Capsule by mouth daily.  ondansetron (ZOFRAN ODT) 4 mg disintegrating tablet Take 1 Tablet by mouth every eight (8) hours as needed for Nausea or Vomiting. Social History:  Social History     Tobacco Use    Smoking status: Not on file    Smokeless tobacco: Not on file   Substance Use Topics    Alcohol use: Not on file       Pt denies any history of tobacco, ETOH, or drug use. Family History:  No family history on file. Review of Systems:  A detailed 10 system ROS is obtained, with pertinent positives as listed above. All others are negative. Objective:     Physical Exam:  Vitals:  Visit Vitals  /66   Pulse 84   Temp 97.6 °F (36.4 °C)   Resp 18   SpO2 100%     Gen:  Pt is alert, cooperative, no acute distress, lying in bed, face mask in place. Skin:  Extremities and face reveal no rashes. HEENT: Sclerae anicteric. Extra-occular muscles are intact. No oral ulcers. No abnormal pigmentation of the lips. The neck is supple. Cardiovascular: Regular rate and rhythm. No murmurs, gallops, or rubs. Respiratory:  Comfortable breathing with no accessory muscle use. Clear breath sounds anteriorly with no wheezes, rales, or rhonchi. GI:  Abdomen nondistended, soft, and nontender. Normal active bowel sounds. No enlargement of the liver or spleen. No masses palpable. Rectal:  Deferred  Musculoskeletal:  No pitting edema of the lower legs.     Neurological: Gross memory appears intact. Patient is alert and oriented. Psychiatric:  Mood appears appropriate with judgement intact. Lymphatic:  No cervical or supraclavicular adenopathy. Laboratory:    Recent Labs     02/15/22  0719 02/15/22  0650 02/12/22  1022   WBC  --  11.4* 10.8   HGB  --  7.4* 10.9*   HCT  --  22.2* 32.3*   PLT  --  289 303   MCV  --  93.3 89.7   NA  --  140 141   K  --  4.3 4.3   CL  --  110* 109*   CO2  --  27 26   BUN  --  30* 25*   CREA  --  0.86 0.82   CA  --  8.2* 8.4   GLU  --  134* 137*   AP  --  54 65   AST  --  19 20   ALT  --  33 39   TBILI  --  0.2 0.4   ALB  --  3.1* 3.6   TP  --  6.0* 6.8   LPSE  --  214 193   PTP 13.6  --   --    INR 1.0  --   --       Ref. Range 2/15/2022 07:19   Lactic acid Latest Ref Range: 0.4 - 2.0 MMOL/L 1.3      Ref. Range 2/15/2022 07:31   Occult blood, stool (POC) Latest Ref Range: NEG   Positive (A)     Assessment:     Active Problems:    Acute blood loss anemia (2/15/2022)    34 yo male, now pt of Dr. Tyler Marino. with no significant PMH, who is seen in consultation 15 Feb 2022 at the request of MODESTO Neri MD for syncope, ABLA, melena, who has been having a week of black stools, fatigue, dizziness, and burping, and has begun having right sided abd pain, nausea, vomiting with black emesis, and syncopal episodes. Hgb has decreased from 10.9 to 7.4 and increased BUN 25 to 30, with guaiac positive stool. DDx included inflammation, ulceration, AVMs. Plan:     - Supportive care, IVF.  - EGD today with Dr. Tyler Marino.  Discussed risks including but not limited to bleeding, perforation, acute cardiopulmonary event, sedation risks, IV complications, aspiration, and pt states understanding and agrees to proceed. - NPO.  - Protonix 40 mg IV Q12 hrs. - Follow. Patient is seen and examined in collaboration with Dr. Jorge A Garcia.  Assessment and plan as per Dr. Eric Tiwari.  Jeannine Gonsales  [facility]

## 2022-02-15 NOTE — PROGRESS NOTES
Care Management Interventions  PCP Verified by CM: Yes (Jayden Zendejas)  Mode of Transport at Discharge: Other (see comment) (Family)  Transition of Care Consult (CM Consult): Discharge Planning  MyChart Signup: No  Discharge Durable Medical Equipment: No  Physical Therapy Consult: No  Occupational Therapy Consult: No  Speech Therapy Consult: No  Support Systems: Spouse/Significant Other Rigoberto Spatz, [phone])  Confirm Follow Up Transport: Family  The Plan for Transition of Care is Related to the Following Treatment Goals : Work with patient until back to baseline  The Patient and/or Patient Representative was Provided with a Choice of Provider and Agrees with the Discharge Plan?: Yes  Freedom of Choice List was Provided with Basic Dialogue that Supports the Patient's Individualized Plan of Care/Goals, Treatment Preferences and Shares the Quality Data Associated with the Providers?: Yes  The [employer] Information Provided?: Yes  Discharge Location  Patient Expects to be Discharged to[de-identified] Home with family assistance    SW met with patient while social distancing w/appropriate PPE. Patient's primary language is Portuguese; communicated via [employer] [place]  Marycruz Riley, [id number]). Demographics confirmed on facesheet. Patient was independent w/ADLs prior to hospitalization and required no DME. 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 PCP is Jayden Zendejas. Patient last saw Dr. Vineet Umanzor 1 year ago, and he has an appointment in April to see him. Patient does not have insurance. Verbal education and pamphlet provided in Portuguese on support available thru [facility]. No d/c needs identified. SW will continue to follow.     RUSTY Hwang, [facility]   [phone]

## 2022-02-15 NOTE — PROGRESS NOTES
Problem: Falls - Risk of  Goal: *Absence of Falls  Description: Document Isadora Gamma Fall Risk and appropriate interventions in the flowsheet.   Outcome: Progressing Towards Goal  Note: Fall Risk Interventions:  Mobility Interventions: Communicate number of staff needed for ambulation/transfer,Patient to call before getting OOB              Elimination Interventions: Call light in reach,Patient to call for help with toileting needs              Problem: Patient Education: Go to Patient Education Activity  Goal: Patient/Family Education  Outcome: Progressing Towards Goal

## 2022-02-15 NOTE — PROGRESS NOTES
used to communicate with patient. Patient informed to call before getting OOB. He verbalized understanding.

## 2022-02-15 NOTE — PROCEDURES
Esophagogastroduodenoscopy    DATE of PROCEDURE: 2/15/2022    MEDICATIONS ADMINISTERED: MAC    INSTRUMENT: GIF    PROCEDURE:  After obtaining informed consent, the patient was placed in the left lateral position and sedated. The endoscope was advanced under direct vision without difficulty. The esophagus, stomach (including retroflexed views) and duodenum were evaluated. The patient was taken to the recovery area in stable condition. FINDINGS:    OROPHARYNX: Cords and pyriform recesses normal.    ESOPHAGUS: The esophagus is normal. The proximal, mid and distal portions are normal. The Z-Line is unremarkable. STOMACH: The fundus on antegrade and retroflex views is normal. The body, antrum and pylorus are normal.      DUODENUM: The bulb and second portions are normal.     Estimated blood loss: 0-minimal     PLAN:  1. Follow up in AM  2.  Consider colonoscopy in AM if patient agreeable    Evelyne Dance, MD  [facility], [place]

## 2022-02-15 NOTE — ED TRIAGE NOTES
Patient ambulatory to room 10.  ID [id number] used for triage. Patient was seen on Saturday. Patient states that he has been feeling dizzy and has had three syncopal episodes. Patient states right side abdominal pain. States black stool. Patient states same sx as Saturday. Patient states that \"I have been blacking out and completely fainting. \" patient states that he does have some nausea.

## 2022-02-15 NOTE — PROGRESS NOTES
Type of procedure: EGD  Dressing: none   IV: 20 RAC  Peripheral Sensation: present  Movement: present  Gripper socks on?: yes      Personal belongings at bedside: Called PACU for belongings  Family at bedside: contacted  Lung Sounds: present  Heart Sounds: s1, s2  Bowel sounds: present    Patient educated about pain scale, PT, meal times, call light, phone, SCDs,   Opportunity given for questions and concerns.

## 2022-02-15 NOTE — PERIOP NOTES
TRANSFER - OUT REPORT:    Verbal report given to SURAJ Raya on [employer]  being transferred to  for routine post - op       Report consisted of patients Situation, Background, Assessment and   Recommendations(SBAR). Information from the following report(s) SBAR, OR Summary, Procedure Summary, Intake/Output and MAR was reviewed with the receiving nurse. Lines:   Peripheral IV 02/15/22 Right Antecubital (Active)   Site Assessment Clean, dry, & intact 02/15/22 0653   Phlebitis Assessment 0 02/15/22 0653   Infiltration Assessment 0 02/15/22 0653   Dressing Status Clean, dry, & intact 02/15/22 0653   Hub Color/Line Status Pink 02/15/22 3474        Opportunity for questions and clarification was provided.       Patient transported with:   Revuze

## 2022-02-15 NOTE — H&P
Hospitalist History and Physical   Admit Date:  2/15/2022  6:22 AM   Name:  Markus Gomes   Age:  35 y.o. Sex:  male  :  1980   MRN:  [id number]   Room:  PACU/PL    Presenting Complaint: Dizziness and Melena    Reason(s) for Admission: Acute blood loss anemia [D62]     History of Present Illness:   Markus Gomes is a 35 y.o. male with a history of kidney stones several years ago who was initially seen in the ER on  with c/o dizziness and syncope. The history was gathered with the help of the video . CT head was notable only for some possible small calcifications thought to be vascular in origin. His Hb that day was 10.9 with a normal MCV and platelet count. Unfortunately there are no labs for comparison. He also reported a 1 week history of melena. He returned to the ER thi morning with recurrent syncopal episodes. He denies any headaches, chest pain, palpitations, SOB, cough, N/V/D, peripheral edema, fevers. About 1 month ago he started taking spirulina supplements to help lose weight. He denies any NSAID use or other anticoagulants. His Hb today is 7.4, down 2.5g in 3 days. His EKG shows NSR with a 1st degree AVB. FOBT is positive. GI consulted and planning EGD. We are consulted for admission. Review of Systems:  10 systems reviewed and negative except as noted in HPI. Assessment & Plan:   # Acute blood loss anemia 2/2 GI bleed   - Con't IV PPI BID. NPO. EGD today was normal, GI considering colonoscopy tomorrow. Avoid NSAIDs. Appreciate GI help. # Syncope   - Likely related to anemia and blood loss. EKG w/o evidence of acute ischemia but does show 1st degree AVB. Dispo/Discharge Planning: Home when able. Diet: DIET NPO  VTE ppx: SCDs only.   Code status: No Order    Hospital Problems as of 2/15/2022 Date Reviewed: 2/15/2022          Codes Class Noted - Resolved POA    * (Principal) Acute blood loss anemia ICD-10-CM: D62  ICD-9-CM: 285.1 2/15/2022 - Present Yes        Syncope ICD-10-CM: R55  ICD-9-CM: 780.2  2/15/2022 - Present Unknown        Melena ICD-10-CM: K92.1  ICD-9-CM: 578.1  2/15/2022 - Present Unknown        Upper GI bleed ICD-10-CM: K92.2  ICD-9-CM: 578.9  2/15/2022 - Present Unknown              Past History:  History reviewed. No pertinent past medical history. Past Surgical History:   Procedure Laterality Date    HX LITHOTRIPSY        No Known Allergies   Social History     Tobacco Use    Smoking status: Never Smoker    Smokeless tobacco: Not on file   Substance Use Topics    Alcohol use: Not Currently      Family History   Problem Relation Age of Onset    Diabetes Mother       Family history reviewed and negative except as noted above. There is no immunization history on file for this patient.   Prior to Admit Medications:  Current Outpatient Medications   Medication Instructions    omeprazole (PRILOSEC) 20 mg, Oral, DAILY    ondansetron (ZOFRAN ODT) 4 mg, Oral, EVERY 8 HOURS AS NEEDED       Objective:     Patient Vitals for the past 24 hrs:   Temp Pulse Resp BP SpO2   02/15/22 1224  75 16 117/70 97 %   02/15/22 1219  76 16 (!) 112/58 97 %   02/15/22 1214  75 16 114/62 97 %   02/15/22 1209 98.6 °F (37 °C) 90 18 (!) 106/57 97 %   02/15/22 1144 99.5 °F (37.5 °C) 95  135/71 100 %   02/15/22 1045  93  109/64 94 %   02/15/22 1044   26     02/15/22 1030  74 19 126/68 98 %   02/15/22 1010  78 20 126/66 99 %   02/15/22 1000  85 21 127/63 99 %   02/15/22 0943 98.7 °F (37.1 °C) 80 17 (!) 125/59 100 %   02/15/22 0930  88 16 136/72 100 %   02/15/22 0929 98.6 °F (37 °C)       02/15/22 0835   23     02/15/22 0834  94  124/67 100 %   02/15/22 0833  (!) 109 23 121/61 100 %   02/15/22 0830  79 24 121/63 100 %   02/15/22 0626 97.6 °F (36.4 °C) 84 18  100 %   02/15/22 0625    122/66      Oxygen Therapy  O2 Sat (%): 97 % (02/15/22 1224)  Pulse via Oximetry: 93 beats per minute (02/15/22 1045)  O2 Device: None (Room air) (02/15/22 1224)  Skin Assessment: Clean, dry, & intact (02/15/22 1224)  O2 Flow Rate (L/min): 2 l/min (02/15/22 1214)    Estimated body mass index is 27 kg/m² as calculated from the following:    Height as of 2/12/22: 5' 6.93\" (1.7 m). Weight as of 2/12/22: 78 kg (172 lb). Intake/Output Summary (Last 24 hours) at 2/15/2022 1229  Last data filed at 2/15/2022 1203  Gross per 24 hour   Intake 1622.5 ml   Output 0 ml   Net 1622.5 ml         Physical Exam:  Blood pressure 117/70, pulse 75, temperature 98.6 °F (37 °C), resp. rate 16, SpO2 97 %. General:    Well nourished. No overt distress  Head:  Normocephalic, atraumatic  Eyes:  Sclerae appear normal.  Pupils equally round. ENT:  Nares appear normal, no drainage. Moist oral mucosa  Neck:  No restricted ROM. Trachea midline   CV:   Tachycardia low 100s. No m/r/g. No jugular venous distension. Lungs:   CTAB. No wheezing, rhonchi, or rales. Respirations even, unlabored  Abdomen: Bowel sounds present. Soft, nontender, nondistended. Extremities: No cyanosis or clubbing. No edema  Skin:     No rashes and normal coloration. Warm and dry. Neuro:  CN II-XII grossly intact. Sensation intact. A&Ox3  Psych:  Normal mood and affect.       I have reviewed ordered lab tests and independently visualized imaging below:    Last 24hr Labs:  Recent Results (from the past 24 hour(s))   CBC WITH AUTOMATED DIFF    Collection Time: 02/15/22  6:50 AM   Result Value Ref Range    WBC 11.4 (H) 4.3 - 11.1 K/uL    RBC 2.38 (L) 4.23 - 5.6 M/uL    HGB 7.4 (L) 13.6 - 17.2 g/dL    HCT 22.2 (L) 41.1 - 50.3 %    MCV 93.3 79.6 - 97.8 FL    MCH 31.1 26.1 - 32.9 PG    MCHC 33.3 31.4 - 35.0 g/dL    RDW 14.2 11.9 - 14.6 %    PLATELET 097 624 - 348 K/uL    MPV 9.1 (L) 9.4 - 12.3 FL    ABSOLUTE NRBC 0.02 0.0 - 0.2 K/uL    DF AUTOMATED      NEUTROPHILS 73 43 - 78 %    LYMPHOCYTES 17 13 - 44 %    MONOCYTES 6 4.0 - 12.0 %    EOSINOPHILS 0 (L) 0.5 - 7.8 %    BASOPHILS 0 0.0 - 2.0 %    IMMATURE GRANULOCYTES 3 0.0 - 5.0 %    ABS. NEUTROPHILS 8.4 (H) 1.7 - 8.2 K/UL    ABS. LYMPHOCYTES 2.0 0.5 - 4.6 K/UL    ABS. MONOCYTES 0.7 0.1 - 1.3 K/UL    ABS. EOSINOPHILS 0.0 0.0 - 0.8 K/UL    ABS. BASOPHILS 0.0 0.0 - 0.2 K/UL    ABS. IMM. GRANS. 0.3 0.0 - 0.5 K/UL   METABOLIC PANEL, COMPREHENSIVE    Collection Time: 02/15/22  6:50 AM   Result Value Ref Range    Sodium 140 136 - 145 mmol/L    Potassium 4.3 3.5 - 5.1 mmol/L    Chloride 110 (H) 98 - 107 mmol/L    CO2 27 21 - 32 mmol/L    Anion gap 3 (L) 7 - 16 mmol/L    Glucose 134 (H) 65 - 100 mg/dL    BUN 30 (H) 6 - 23 MG/DL    Creatinine 0.86 0.8 - 1.5 MG/DL    GFR est AA >60 >60 ml/min/1.73m2    GFR est non-AA >60 >60 ml/min/1.73m2    Calcium 8.2 (L) 8.3 - 10.4 MG/DL    Bilirubin, total 0.2 0.2 - 1.1 MG/DL    ALT (SGPT) 33 12 - 65 U/L    AST (SGOT) 19 15 - 37 U/L    Alk.  phosphatase 54 50 - 136 U/L    Protein, total 6.0 (L) 6.3 - 8.2 g/dL    Albumin 3.1 (L) 3.5 - 5.0 g/dL    Globulin 2.9 2.3 - 3.5 g/dL    A-G Ratio 1.1 (L) 1.2 - 3.5     LIPASE    Collection Time: 02/15/22  6:50 AM   Result Value Ref Range    Lipase 214 73 - 393 U/L   TYPE & SCREEN    Collection Time: 02/15/22  7:19 AM   Result Value Ref Range    Crossmatch Expiration 02/18/2022,2355     ABO/Rh(D) A POSITIVE     Antibody screen NEG     Unit number [id number]     Blood component type RC LR     Unit division 00     Status of unit ISSUED     Crossmatch result Compatible    PROTHROMBIN TIME + INR    Collection Time: 02/15/22  7:19 AM   Result Value Ref Range    Prothrombin time 13.6 12.6 - 14.5 sec    INR 1.0     LACTIC ACID    Collection Time: 02/15/22  7:19 AM   Result Value Ref Range    Lactic acid 1.3 0.4 - 2.0 MMOL/L   MAGNESIUM    Collection Time: 02/15/22  7:19 AM   Result Value Ref Range    Magnesium 2.2 1.8 - 2.4 mg/dL   POC FECAL OCCULT BLOOD    Collection Time: 02/15/22  7:31 AM   Result Value Ref Range    Occult blood, stool (POC) Positive (A) NEG     RBC, ALLOCATE Collection Time: 02/15/22  8:15 AM   Result Value Ref Range    HISTORY CHECKED? Historical check performed    COVID-19 RAPID TEST    Collection Time: 02/15/22  9:42 AM   Result Value Ref Range    Specimen source Nasopharyngeal      COVID-19 rapid test Not detected NOTD         All Micro Results     Procedure Component Value Units Date/Time    COVID-19 RAPID TEST [[id number]] Collected: 02/15/22 0942    Order Status: Completed Specimen: Nasopharyngeal Updated: 02/15/22 1007     Specimen source Nasopharyngeal        COVID-19 rapid test Not detected        Comment:      The specimen is NEGATIVE for SARS-CoV-2, the novel coronavirus associated with COVID-19. A negative result does not rule out COVID-19. This test has been authorized by the FDA under an Emergency Use Authorization (EUA) for use by authorized laboratories. Fact sheet for Healthcare Providers: ConventionUpdate.co.nz  Fact sheet for Patients: ConventionUpdate.co.nz       Methodology: Isothermal Nucleic Acid Amplification               Other Studies:  No results found. Medications Administered     pantoprazole (PROTONIX) 40 mg in 0.9% sodium chloride 10 mL injection     Admin Date  02/15/2022 Action  Given Dose  40 mg Route  IntraVENous Administered By  [employer]          sodium chloride 0.9 % bolus infusion 1,000 mL     Admin Date  02/15/2022 Action  New Bag Dose  1,000 mL Route  IntraVENous Administered By  Mat Dimes                Signed:  Sue Muñoz MD    Part of this note may have been written by using a voice dictation software. The note has been proof read but may still contain some grammatical/other typographical errors.

## 2022-02-16 ENCOUNTER — ANESTHESIA EVENT (OUTPATIENT)
Dept: ENDOSCOPY | Age: 42
DRG: 812 | End: 2022-02-16

## 2022-02-16 ENCOUNTER — ANESTHESIA (OUTPATIENT)
Dept: ENDOSCOPY | Age: 42
DRG: 812 | End: 2022-02-16

## 2022-02-16 VITALS
HEART RATE: 65 BPM | RESPIRATION RATE: 16 BRPM | OXYGEN SATURATION: 95 % | DIASTOLIC BLOOD PRESSURE: 79 MMHG | TEMPERATURE: 97.8 F | SYSTOLIC BLOOD PRESSURE: 124 MMHG

## 2022-02-16 PROBLEM — D62 ACUTE BLOOD LOSS ANEMIA: Status: RESOLVED | Noted: 2022-02-15 | Resolved: 2022-02-16

## 2022-02-16 PROBLEM — R55 SYNCOPE: Status: RESOLVED | Noted: 2022-02-15 | Resolved: 2022-02-16

## 2022-02-16 PROBLEM — K92.1 MELENA: Status: RESOLVED | Noted: 2022-02-15 | Resolved: 2022-02-16

## 2022-02-16 PROBLEM — K92.2 UPPER GI BLEED: Status: RESOLVED | Noted: 2022-02-15 | Resolved: 2022-02-16

## 2022-02-16 LAB
ABO + RH BLD: NORMAL
ATRIAL RATE: 76 BPM
BLD PROD TYP BPU: NORMAL
BLOOD GROUP ANTIBODIES SERPL: NORMAL
BPU ID: NORMAL
CALCULATED P AXIS, ECG09: 66 DEGREES
CALCULATED R AXIS, ECG10: 37 DEGREES
CALCULATED T AXIS, ECG11: 3 DEGREES
CROSSMATCH RESULT,%XM: NORMAL
DIAGNOSIS, 93000: NORMAL
ERYTHROCYTE [DISTWIDTH] IN BLOOD BY AUTOMATED COUNT: 14.6 % (ref 11.9–14.6)
HCT VFR BLD AUTO: 25.4 % (ref 41.1–50.3)
HGB BLD-MCNC: 8.3 G/DL (ref 13.6–17.2)
MCH RBC QN AUTO: 30.2 PG (ref 26.1–32.9)
MCHC RBC AUTO-ENTMCNC: 32.7 G/DL (ref 31.4–35)
MCV RBC AUTO: 92.4 FL (ref 79.6–97.8)
NRBC # BLD: 0.02 K/UL (ref 0–0.2)
P-R INTERVAL, ECG05: 268 MS
PLATELET # BLD AUTO: 251 K/UL (ref 150–450)
PMV BLD AUTO: 8.7 FL (ref 9.4–12.3)
Q-T INTERVAL, ECG07: 368 MS
QRS DURATION, ECG06: 84 MS
QTC CALCULATION (BEZET), ECG08: 414 MS
RBC # BLD AUTO: 2.75 M/UL (ref 4.23–5.6)
SPECIMEN EXP DATE BLD: NORMAL
STATUS OF UNIT,%ST: NORMAL
UNIT DIVISION, %UDIV: 0
VENTRICULAR RATE, ECG03: 76 BPM
WBC # BLD AUTO: 9 K/UL (ref 4.3–11.1)

## 2022-02-16 PROCEDURE — 85027 COMPLETE CBC AUTOMATED: CPT

## 2022-02-16 PROCEDURE — 74011000250 HC RX REV CODE- 250: Performed by: NURSE ANESTHETIST, CERTIFIED REGISTERED

## 2022-02-16 PROCEDURE — C9113 INJ PANTOPRAZOLE SODIUM, VIA: HCPCS | Performed by: INTERNAL MEDICINE

## 2022-02-16 PROCEDURE — 74011000250 HC RX REV CODE- 250: Performed by: INTERNAL MEDICINE

## 2022-02-16 PROCEDURE — 74011250636 HC RX REV CODE- 250/636: Performed by: INTERNAL MEDICINE

## 2022-02-16 PROCEDURE — 76040000025: Performed by: INTERNAL MEDICINE

## 2022-02-16 PROCEDURE — 2709999900 HC NON-CHARGEABLE SUPPLY: Performed by: INTERNAL MEDICINE

## 2022-02-16 PROCEDURE — 74011250636 HC RX REV CODE- 250/636: Performed by: NURSE ANESTHETIST, CERTIFIED REGISTERED

## 2022-02-16 PROCEDURE — 36415 COLL VENOUS BLD VENIPUNCTURE: CPT

## 2022-02-16 PROCEDURE — 0DJD8ZZ INSPECTION OF LOWER INTESTINAL TRACT, VIA NATURAL OR ARTIFICIAL OPENING ENDOSCOPIC: ICD-10-PCS | Performed by: INTERNAL MEDICINE

## 2022-02-16 PROCEDURE — 76060000031 HC ANESTHESIA FIRST 0.5 HR: Performed by: INTERNAL MEDICINE

## 2022-02-16 RX ORDER — SODIUM CHLORIDE 0.9 % (FLUSH) 0.9 %
5-40 SYRINGE (ML) INJECTION AS NEEDED
Status: DISCONTINUED | OUTPATIENT
Start: 2022-02-16 | End: 2022-02-16 | Stop reason: HOSPADM

## 2022-02-16 RX ORDER — SODIUM CHLORIDE, SODIUM LACTATE, POTASSIUM CHLORIDE, CALCIUM CHLORIDE 600; 310; 30; 20 MG/100ML; MG/100ML; MG/100ML; MG/100ML
INJECTION, SOLUTION INTRAVENOUS
Status: DISCONTINUED | OUTPATIENT
Start: 2022-02-16 | End: 2022-02-16 | Stop reason: HOSPADM

## 2022-02-16 RX ORDER — PANTOPRAZOLE SODIUM 40 MG/1
40 TABLET, DELAYED RELEASE ORAL 2 TIMES DAILY
Qty: 60 TABLET | Refills: 1 | Status: SHIPPED | OUTPATIENT
Start: 2022-02-16 | End: 2022-04-17

## 2022-02-16 RX ORDER — SODIUM CHLORIDE 9 MG/ML
10 INJECTION, SOLUTION INTRAVENOUS CONTINUOUS
Status: CANCELLED | OUTPATIENT
Start: 2022-02-16

## 2022-02-16 RX ORDER — PROPOFOL 10 MG/ML
INJECTION, EMULSION INTRAVENOUS
Status: DISCONTINUED | OUTPATIENT
Start: 2022-02-16 | End: 2022-02-16 | Stop reason: HOSPADM

## 2022-02-16 RX ORDER — SODIUM CHLORIDE 0.9 % (FLUSH) 0.9 %
5-40 SYRINGE (ML) INJECTION EVERY 8 HOURS
Status: DISCONTINUED | OUTPATIENT
Start: 2022-02-16 | End: 2022-02-16 | Stop reason: HOSPADM

## 2022-02-16 RX ORDER — SODIUM CHLORIDE, SODIUM LACTATE, POTASSIUM CHLORIDE, CALCIUM CHLORIDE 600; 310; 30; 20 MG/100ML; MG/100ML; MG/100ML; MG/100ML
100 INJECTION, SOLUTION INTRAVENOUS CONTINUOUS
Status: CANCELLED | OUTPATIENT
Start: 2022-02-16

## 2022-02-16 RX ORDER — LIDOCAINE HYDROCHLORIDE 20 MG/ML
INJECTION, SOLUTION EPIDURAL; INFILTRATION; INTRACAUDAL; PERINEURAL AS NEEDED
Status: DISCONTINUED | OUTPATIENT
Start: 2022-02-16 | End: 2022-02-16 | Stop reason: HOSPADM

## 2022-02-16 RX ORDER — SODIUM CHLORIDE, SODIUM LACTATE, POTASSIUM CHLORIDE, CALCIUM CHLORIDE 600; 310; 30; 20 MG/100ML; MG/100ML; MG/100ML; MG/100ML
100 INJECTION, SOLUTION INTRAVENOUS CONTINUOUS
Status: DISCONTINUED | OUTPATIENT
Start: 2022-02-16 | End: 2022-02-16 | Stop reason: HOSPADM

## 2022-02-16 RX ORDER — PROPOFOL 10 MG/ML
INJECTION, EMULSION INTRAVENOUS AS NEEDED
Status: DISCONTINUED | OUTPATIENT
Start: 2022-02-16 | End: 2022-02-16 | Stop reason: HOSPADM

## 2022-02-16 RX ADMIN — PROPOFOL 100 MG: 10 INJECTION, EMULSION INTRAVENOUS at 12:57

## 2022-02-16 RX ADMIN — LIDOCAINE HYDROCHLORIDE 60 MG: 20 INJECTION, SOLUTION EPIDURAL; INFILTRATION; INTRACAUDAL; PERINEURAL at 12:55

## 2022-02-16 RX ADMIN — SODIUM CHLORIDE, PRESERVATIVE FREE 40 MG: 5 INJECTION INTRAVENOUS at 08:15

## 2022-02-16 RX ADMIN — SODIUM CHLORIDE, SODIUM LACTATE, POTASSIUM CHLORIDE, AND CALCIUM CHLORIDE: 600; 310; 30; 20 INJECTION, SOLUTION INTRAVENOUS at 12:53

## 2022-02-16 RX ADMIN — PROPOFOL 200 MCG/KG/MIN: 10 INJECTION, EMULSION INTRAVENOUS at 12:57

## 2022-02-16 NOTE — PROGRESS NOTES
Hospitalist Progress Note   Admit Date:  2/15/2022  6:22 AM   Name:  Page Monroy   Age:  35 y.o. Sex:  male  :  1980   MRN:  [id number]   Room:  [facility]– [place]    Presenting Complaint: Dizziness and Melena    Reason(s) for Admission: Acute blood loss anemia [D62]     Hospital Course & Interval History:   Page Monroy is a 35 y.o. male with a history of kidney stones several years ago who was initially seen in the ER on  with c/o dizziness and syncope. CT head was notable only for some possible small calcifications thought to be vascular in origin. His Hb that day was 10.9 with a normal MCV and platelet count. He returned to the ER on 2/15 with recurrent syncopal episodes. About 1 month ago he started taking spirulina supplements to help lose weight. His Hb today is 7.4, down 2.5g in 3 days. His EKG shows NSR with a 1st degree AVB. FOBT is positive. GI following. EGD 2/15 normal. Colonoscopy . Subjective/24hr Events (22): Video  used. Hb improved to 8.6 today. Had one episode of melena after starting his bowel prep but after that said it was just \"watery\". No abdo pain. He has been up ambulating to the BR and the room without any dizziness or syncope. Colonoscopy today. No chest pain or SOB. ROS:  10 systems reviewed and negative except as noted above. Assessment & Plan:   # Acute blood loss anemia 2/2 GI bleed              - Con't IV PPI BID. EGD 2/15 was normal, colonoscopy planned today. Recently started on spirulina supplements for weight loss but unsure if that can increase bleeding.     # Syncope              - Resolved. Likely related to anemia and blood loss. EKG w/o evidence of acute ischemia but does show 1st degree AVB. Dispo/Discharge Planning: Home when able.   Diet:  DIET NPO  DVT PPx: SCDs, ambulation  Code status: Full Code    Hospital Problems as of 2022 Date Reviewed: 2/15/2022          Codes Class Noted - Resolved POA    * (Principal) Acute blood loss anemia ICD-10-CM: D62  ICD-9-CM: 285.1  2/15/2022 - Present Yes        Syncope ICD-10-CM: R55  ICD-9-CM: 780.2  2/15/2022 - Present Unknown        Melena ICD-10-CM: K92.1  ICD-9-CM: 578.1  2/15/2022 - Present Unknown        Upper GI bleed ICD-10-CM: K92.2  ICD-9-CM: 578.9  2/15/2022 - Present Unknown              Objective:     Patient Vitals for the past 24 hrs:   Temp Pulse Resp BP SpO2   02/16/22 0744 98.7 °F (37.1 °C) 72 16 123/73 99 %   02/16/22 0400  68      02/16/22 0323 98.1 °F (36.7 °C) 69 16 114/70 98 %   02/16/22 0000  93      02/15/22 2325 98.3 °F (36.8 °C) 69 16 117/70 97 %   02/15/22 1955 98.3 °F (36.8 °C) 81 16 133/78 99 %   02/15/22 1600  84      02/15/22 1416  78 16 (!) 142/81 100 %   02/15/22 1345  68 16 122/65 99 %   02/15/22 1330  77 16 121/76 98 %   02/15/22 1314  76 16 122/74 98 %   02/15/22 1259  70 16 110/66 98 %   02/15/22 1249  72 16 115/70 99 %   02/15/22 1239  87 16 114/74 98 %   02/15/22 1234  70 16 113/74 98 %   02/15/22 1229  83 16 110/72 98 %   02/15/22 1224  75 16 117/70 97 %   02/15/22 1219  76 16 (!) 112/58 97 %   02/15/22 1214  75 16 114/62 97 %   02/15/22 1209 98.6 °F (37 °C) 90 18 (!) 106/57 97 %   02/15/22 1144 99.5 °F (37.5 °C) 95  135/71 100 %   02/15/22 1045  93  109/64 94 %   02/15/22 1044   26       Oxygen Therapy  O2 Sat (%): 99 % (02/16/22 0744)  Pulse via Oximetry: 93 beats per minute (02/15/22 1045)  O2 Device: None (Room air) (02/15/22 1345)  Skin Assessment: Clean, dry, & intact (02/15/22 1345)  O2 Flow Rate (L/min): 2 l/min (02/15/22 1214)    Estimated body mass index is 27 kg/m² as calculated from the following:    Height as of 2/12/22: 5' 6.93\" (1.7 m). Weight as of 2/12/22: 78 kg (172 lb).     Intake/Output Summary (Last 24 hours) at 2/16/2022 1037  Last data filed at 2/15/2022 1345  Gross per 24 hour   Intake 622.5 ml   Output 700 ml   Net -77.5 ml         Physical Exam:   Blood pressure 123/73, pulse 72, temperature 98.7 °F (37.1 °C), resp. rate 16, SpO2 99 %. General:    Well nourished. No overt distress  Head:  Normocephalic, atraumatic  Eyes:  Sclerae appear normal.  Pupils equally round. ENT:  Nares appear normal, no drainage. Moist oral mucosa  Neck:  No restricted ROM. Trachea midline   CV:   RRR. No m/r/g. No jugular venous distension. Lungs:   CTAB. No wheezing, rhonchi, or rales. Respirations even, unlabored  Abdomen: Bowel sounds present. Soft, nontender, nondistended. Extremities: No cyanosis or clubbing. No edema  Skin:     No rashes and normal coloration. Warm and dry. Neuro:  CN II-XII grossly intact. Sensation intact. A&Ox3  Psych:  Normal mood and affect. I have reviewed ordered lab tests and independently visualized imaging below:    Recent Labs:  Recent Results (from the past 48 hour(s))   EKG, 12 LEAD, INITIAL    Collection Time: 02/15/22  6:44 AM   Result Value Ref Range    Ventricular Rate 76 BPM    Atrial Rate 76 BPM    P-R Interval 268 ms    QRS Duration 84 ms    Q-T Interval 368 ms    QTC Calculation (Bezet) 414 ms    Calculated P Axis 66 degrees    Calculated R Axis 37 degrees    Calculated T Axis 3 degrees    Diagnosis       !! AGE AND GENDER SPECIFIC ECG ANALYSIS !!   Sinus rhythm with 1st degree A-V block  inferior NSSTTW changes    Confirmed by Elaine Colye MD ()JUANITA ([id number]) on 2/16/2022 8:15:31 AM     CBC WITH AUTOMATED DIFF    Collection Time: 02/15/22  6:50 AM   Result Value Ref Range    WBC 11.4 (H) 4.3 - 11.1 K/uL    RBC 2.38 (L) 4.23 - 5.6 M/uL    HGB 7.4 (L) 13.6 - 17.2 g/dL    HCT 22.2 (L) 41.1 - 50.3 %    MCV 93.3 79.6 - 97.8 FL    MCH 31.1 26.1 - 32.9 PG    MCHC 33.3 31.4 - 35.0 g/dL    RDW 14.2 11.9 - 14.6 %    PLATELET 332 607 - 175 K/uL    MPV 9.1 (L) 9.4 - 12.3 FL    ABSOLUTE NRBC 0.02 0.0 - 0.2 K/uL    DF AUTOMATED      NEUTROPHILS 73 43 - 78 %    LYMPHOCYTES 17 13 - 44 %    MONOCYTES 6 4.0 - 12.0 %    EOSINOPHILS 0 (L) 0.5 - 7.8 %    BASOPHILS 0 0.0 - 2.0 %    IMMATURE GRANULOCYTES 3 0.0 - 5.0 %    ABS. NEUTROPHILS 8.4 (H) 1.7 - 8.2 K/UL    ABS. LYMPHOCYTES 2.0 0.5 - 4.6 K/UL    ABS. MONOCYTES 0.7 0.1 - 1.3 K/UL    ABS. EOSINOPHILS 0.0 0.0 - 0.8 K/UL    ABS. BASOPHILS 0.0 0.0 - 0.2 K/UL    ABS. IMM. GRANS. 0.3 0.0 - 0.5 K/UL   METABOLIC PANEL, COMPREHENSIVE    Collection Time: 02/15/22  6:50 AM   Result Value Ref Range    Sodium 140 136 - 145 mmol/L    Potassium 4.3 3.5 - 5.1 mmol/L    Chloride 110 (H) 98 - 107 mmol/L    CO2 27 21 - 32 mmol/L    Anion gap 3 (L) 7 - 16 mmol/L    Glucose 134 (H) 65 - 100 mg/dL    BUN 30 (H) 6 - 23 MG/DL    Creatinine 0.86 0.8 - 1.5 MG/DL    GFR est AA >60 >60 ml/min/1.73m2    GFR est non-AA >60 >60 ml/min/1.73m2    Calcium 8.2 (L) 8.3 - 10.4 MG/DL    Bilirubin, total 0.2 0.2 - 1.1 MG/DL    ALT (SGPT) 33 12 - 65 U/L    AST (SGOT) 19 15 - 37 U/L    Alk.  phosphatase 54 50 - 136 U/L    Protein, total 6.0 (L) 6.3 - 8.2 g/dL    Albumin 3.1 (L) 3.5 - 5.0 g/dL    Globulin 2.9 2.3 - 3.5 g/dL    A-G Ratio 1.1 (L) 1.2 - 3.5     LIPASE    Collection Time: 02/15/22  6:50 AM   Result Value Ref Range    Lipase 214 73 - 393 U/L   TYPE & SCREEN    Collection Time: 02/15/22  7:19 AM   Result Value Ref Range    Crossmatch Expiration 02/18/2022,2359     ABO/Rh(D) A POSITIVE     Antibody screen NEG     Unit number [id number]     Blood component type RC LR     Unit division 00     Status of unit TRANSFUSED     Crossmatch result Compatible    PROTHROMBIN TIME + INR    Collection Time: 02/15/22  7:19 AM   Result Value Ref Range    Prothrombin time 13.6 12.6 - 14.5 sec    INR 1.0     LACTIC ACID    Collection Time: 02/15/22  7:19 AM   Result Value Ref Range    Lactic acid 1.3 0.4 - 2.0 MMOL/L   MAGNESIUM    Collection Time: 02/15/22  7:19 AM   Result Value Ref Range    Magnesium 2.2 1.8 - 2.4 mg/dL   POC FECAL OCCULT BLOOD    Collection Time: 02/15/22  7:31 AM   Result Value Ref Range    Occult blood, stool (POC) Positive (A) NEG     RBC, ALLOCATE    Collection Time: 02/15/22  8:15 AM   Result Value Ref Range    HISTORY CHECKED? Historical check performed    COVID-19 RAPID TEST    Collection Time: 02/15/22  9:42 AM   Result Value Ref Range    Specimen source Nasopharyngeal      COVID-19 rapid test Not detected NOTD     CBC W/O DIFF    Collection Time: 02/16/22  5:25 AM   Result Value Ref Range    WBC 9.0 4.3 - 11.1 K/uL    RBC 2.75 (L) 4.23 - 5.6 M/uL    HGB 8.3 (L) 13.6 - 17.2 g/dL    HCT 25.4 (L) 41.1 - 50.3 %    MCV 92.4 79.6 - 97.8 FL    MCH 30.2 26.1 - 32.9 PG    MCHC 32.7 31.4 - 35.0 g/dL    RDW 14.6 11.9 - 14.6 %    PLATELET 001 192 - 395 K/uL    MPV 8.7 (L) 9.4 - 12.3 FL    ABSOLUTE NRBC 0.02 0.0 - 0.2 K/uL       All Micro Results     Procedure Component Value Units Date/Time    COVID-19 RAPID TEST [[id number]] Collected: 02/15/22 0942    Order Status: Completed Specimen: Nasopharyngeal Updated: 02/15/22 1007     Specimen source Nasopharyngeal        COVID-19 rapid test Not detected        Comment:      The specimen is NEGATIVE for SARS-CoV-2, the novel coronavirus associated with COVID-19. A negative result does not rule out COVID-19. This test has been authorized by the FDA under an Emergency Use Authorization (EUA) for use by authorized laboratories. Fact sheet for Healthcare Providers: Perla.fi  Fact sheet for Patients: Perla.angie       Methodology: Isothermal Nucleic Acid Amplification               Other Studies:  No results found.     Current Meds:  Current Facility-Administered Medications   Medication Dose Route Frequency    sodium chloride (NS) flush 5-10 mL  5-10 mL IntraVENous Q8H    sodium chloride (NS) flush 5-10 mL  5-10 mL IntraVENous PRN    0.9% sodium chloride infusion 250 mL  250 mL IntraVENous PRN    pantoprazole (PROTONIX) 40 mg in 0.9% sodium chloride 10 mL injection  40 mg IntraVENous Q12H    sodium chloride (NS) flush 5-40 mL  5-40 mL IntraVENous Q8H    sodium chloride (NS) flush 5-40 mL  5-40 mL IntraVENous PRN    ondansetron (ZOFRAN) injection 4 mg  4 mg IntraVENous Q4H PRN       Signed:  Majo Ortiz MD    Part of this note may have been written by using a voice dictation software. The note has been proof read but may still contain some grammatical/other typographical errors.

## 2022-02-16 NOTE — PROGRESS NOTES
Patient progressing on bowel prep. Used  to explain to pt that this must be done by 4 am and patient verbalized understanding.

## 2022-02-16 NOTE — ANESTHESIA PREPROCEDURE EVALUATION
Relevant Problems   HEMATOLOGY   (+) Acute blood loss anemia       Anesthetic History   No history of anesthetic complications            Review of Systems / Medical History  Patient summary reviewed and pertinent labs reviewed    Pulmonary  Within defined limits                 Neuro/Psych   Within defined limits           Cardiovascular    Hypertension (borderline)              Exercise tolerance: >4 METS     GI/Hepatic/Renal     GERD: well controlled    Renal disease: stones       Endo/Other        Anemia     Other Findings   Comments: hgb 10 to 7 in 3 days with syncope         Physical Exam    Airway  Mallampati: I  TM Distance: 4 - 6 cm  Neck ROM: normal range of motion   Mouth opening: Normal     Cardiovascular  Regular rate and rhythm,  S1 and S2 normal,  no murmur, click, rub, or gallop  Rhythm: regular  Rate: normal         Dental  No notable dental hx       Pulmonary  Breath sounds clear to auscultation               Abdominal  GI exam deferred       Other Findings            Anesthetic Plan    ASA: 2  Anesthesia type: total IV anesthesia          Induction: Intravenous  Anesthetic plan and risks discussed with: Patient

## 2022-02-16 NOTE — PERIOP NOTES
Dr. Bridgett Abbott at bedside; he advised patient of normal results, no bleeding. Please restart regular diet.

## 2022-02-16 NOTE — PROCEDURES
COLONOSCOPY    DATE of PROCEDURE: 2/16/2022    MEDICATION:  MAC      INSTRUMENT: PCF    PROCEDURE: After obtaining informed consent, the patient was placed in the left lateral position and sedated. The endoscope was advanced to the cecum where the appendiceal orifice and ileocecal valve were identified. On withdrawal, the colon was carefully visualized in a 360 degree fashion. Retroflexion was performed in the rectum. The patient was taken to the recovery area in stable condition. FINDINGS:  ANUS:  Anal exam reveals no masses or hemorrhoids, sphincter tone is normal.    RECTUM: Rectal exam reveals no masses. Small internal hemorrhoids. SIGMOID COLON: The mucosa is normal with good vascular pattern and without ulcers, diverticula or polyps. DESCENDING COLON: The mucosa is normal with good vascular pattern and without ulcers, diverticula or polyps. SPLENIC FLEXURE: The splenic flexure is normal.    TRANSVERSE COLON: The mucosa is normal with good vascular pattern and without ulcers, polyps  or diverticula. HEPATIC FLEXURE: The hepatic flexure is normal.    ASCENDING COLON: The mucosa is normal with good vascular pattern and without ulcers, diverticula or polyps. CECUM: The appendiceal orifice appears normal. The ileocecal valve appears normal.    TERMINAL ILEUM: The terminal ileum was not entered. Estimated blood loss: 0-minimal     ASSESSMENT/PLAN:  1. Follow up in office as outpatient  2. Ok to discharge from GI standpoint  3.  Repeat at age 37      Eileen Madison MD  [facility], [place]

## 2022-02-16 NOTE — PROGRESS NOTES
Pt reminded of NPO status using hospital . Pt verbalized understanding. Pt aware of procedure today. Consent signed and in chart.

## 2022-02-16 NOTE — INTERVAL H&P NOTE
Update History & Physical    The Patient's History and Physical of February 15,   2022 was reviewed with the patient and I examined the patient. There was no change. The surgical site was confirmed by the patient and me. Plan:  The risk, benefits, expected outcome, and alternative to the recommended procedure have been discussed with the patient. Patient understands and wants to proceed with the procedure.     Electronically signed by Candace Aquino MD on 2/16/2022 at 12:31 PM

## 2022-02-16 NOTE — DISCHARGE INSTRUCTIONS
Patient Education        Anemia: Instrucciones de cuidado  Anemia: Care Instructions  Instrucciones de cuidado    La anemia es un bajo nivel de glóbulos rojos, que son los que transportan el oxígeno por el organismo. Muchas cosas pueden causar anemia. La falta de vera es jaya de las causas más comunes. Saez organismo necesita vera para producir hemoglobina, jaya sustancia de los glóbulos rojos que transporta el oxígeno de los pulmones a las células del organismo. Si no hay suficiente vera, el organismo produce glóbulos rojos más pequeños y en ruddy cantidad. Debido a ello, las células de saez organismo no obtienen suficiente oxígeno y usted se sentirá cansado y débil. Y puede tener dificultad para concentrarse. El sangrado es la causa más común de la falta de vera. Podría tener sangrado menstrual abundante o hemorragias causadas por afecciones tales evaristo úlceras, hemorroides o cáncer. El uso habitual de aspirina u otros medicamentos antiinflamatorios (evaristo ibuprofeno) también puede causar sangrado en [employer]. La falta de vera en saez dieta también puede causar anemia, sobre todo en los momentos en los que el organismo necesita más vera, evaristo juanito el [facility], la infancia y la adolescencia. Es posible que saez médico le haya recetado pastillas de vera. El tratamiento puede annika algunos meses hasta que renu niveles de vera regresen a la normalidad. Saez médico también puede sugerirle que consuma alimentos ricos en vera, evaristo carne y frijoles (habichuelas). Existen muchas otras causas de la anemia. No siempre es causada por la falta de vera. Descubrir la causa específica de saez anemia le ayudará a saez médico a encontrar el tratamiento adecuado para usted. La atención de seguimiento es jaya parte clave de saez tratamiento y seguridad. Asegúrese de hacer y acudir a todas las citas, y llame a saez médico si está teniendo problemas.  También es jaya buena idea saber los resultados de renu exámenes y mantener jaya lista de los medicamentos que issac. ¿Cómo puede cuidarse en el hogar? · [employer] medicamentos exactamente evaristo le fueron recetados. Llame a saez médico si aleshia estar teniendo problemas con saez medicamento. · Si saez médico le recomienda pastillas de vera, tómelas según las indicaciones:  ? Trate de annika las pastillas con el estómago vacío 1 hora antes de comer o 2 horas después de comer. Simin podría necesitar annika el vera con la comida para evitar el malestar estomacal.  ? No tome antiácidos, leche o bebidas con cafeína (evaristo café, té o bebidas de cola) al [employer] o 2 horas antes o después de clementina tomado las pastillas de vera. Estos pueden dificultar la absorción del vera en el organismo. ? La vitamina C (de alimentos o suplementos) ayuda a saez organismo a absorber el vera. Trate de annika las pastillas de vera con un vaso de jugo de naranja o con otro tipo de alimento rico en vitamina C, evaristo las frutas cítricas. ? Las pastillas de vera podrían causar [employer], evaristo acidez [place], [place], diarrea, estreñimiento y retortijones. Asegúrese de beber abundantes líquidos e incluya en saez dieta diaria frutas, verduras y [place]. Las pastillas de vera muchas veces hacen que renu evacuaciones nat oscuras o verdosas. ? Si olvida annika saez pastilla de vera, no tome jaya dosis doble la próxima vez.  ? Mantenga las pastillas de vera fuera del alcance de los niños pequeños. La sobredosis de vera puede ser [employer]. · Siga los consejos de saez médico acerca del consumo de alimentos ricos en vera. Entre estos se encuentran las benjamin meadows, los [place], las aves, los [place], los frijoles, las uvas pasas, el pan integral y las verduras de hoja macy. · Prepare las verduras al vapor para que puedan retener saez contenido de vera. ¿Cuándo debe pedir ayuda? Llame al 911 en cualquier momento que considere que necesita atención de [place].  Por ejemplo, llame si:    · Valentin Aguirre síntomas de un ataque al corazón. Estos podrían incluir:  ? Tommas May en el pecho, o jaya sensación extraña en el pecho.  ? Sudoración. ? Falta de aire. ? Náuseas o vómito. ? Dolor, presión o jaya sensación extraña en la espalda, el serena, la mandíbula, la parte superior del abdomen o en abiel o ambos hombros o brazos. ? Aturdimiento o debilidad repentina. ? Latidos del corazón rápidos o irregulares. Después de llamar al 911, es posible que el operador le diga que mastique 1 aspirina para adultos o de 2 a 4 aspirinas de dosis baja. Espere a jaya ambulancia. No intente conducir usted mismo.     · Se desmayó (perdió el conocimiento). Llame a saez médico ahora mismo o busque atención médica inmediata si:    · Presenta nueva o mayor falta de aire.     · Siente mareos o aturdimiento, o que está a punto de desmayarse.     · La fatiga y la debilidad continúan o empeoran.     · Tiene cualquier sangrado anormal, evaristo:  ? Hemorragias (sangrado) nasales. ? Sangrado vaginal que es distinto (más intenso, más frecuente, en un momento diferente del mes) del que usted [facility] tener. ? Heces sanguinolentas o negras, o sangrado por el recto. ? Orina sanguinolenta o de color lambert. Preste especial atención a los cambios en saez varun y asegúrese de comunicarse con saez médico si:    · No mejora evaristo se esperaba. ¿Dónde puede encontrar más información en inglés? Vaya a http://www.gray.com/  Anthony T726 en la búsqueda para aprender más acerca de \"Anemia: Instrucciones de cuidado. \"  Revisado: 29 jesiil, 2021               Versión del contenido: 13.0  © 8981-4853 Healthwise, [employer]. Las instrucciones de cuidado fueron adaptadas bajo licencia por [employer] (which disclaims liability or warranty for this information). Si usted tiene [place] afección médica o sobre estas instrucciones, siempre pregunte a saez profesional de varun.  [employer] niega toda garantía o responsabilidad por saez uso de esta información. Patient Education        Desmayos: Instrucciones de cuidado  Fainting: Care Instructions  Instrucciones de cuidado    Cuando se desmaya, o pierde el conocimiento, usted está inconsciente por un corto tiempo. El desmayo suele ser causado por jaya breve disminución del flujo sanguíneo al cerebro. Al caer o quedar tendido, le fluye más rosa al cerebro y recupera el conocimiento. El estrés emocional, el dolor o el acaloramiento, sobre todo si stauffer estado de pie, pueden hacer que se [place] 1. En estos casos, el desmayo no suele ser grave. Simin el [place] puede ser jaya señal de un problema más grave. Quizás saez médico quiera hacerle más pruebas para descartar otras causas. El tratamiento que requiera depende de la causa del [place]. El médico lo stauffer examinado minuciosamente, simin pueden presentarse problemas más tarde. Si nota algún problema o nuevos síntomas, busque tratamiento médico de inmediato. La atención de seguimiento es jaya parte clave de saez tratamiento y seguridad. Asegúrese de hacer y acudir a todas las citas, y llame a saez médico si está teniendo problemas. También es jaya buena idea saber los resultados de renu exámenes y mantener jaya lista de los medicamentos que issac. ¿Cómo puede cuidarse en el hogar? · Micheline líquidos en abundancia para prevenir la deshidratación. Si tiene jaya enfermedad renal, cardíaca o hepática y tiene que restringir los líquidos, hable con saez médico antes de aumentar saez consumo de líquidos. ¿Cuándo debe pedir ayuda? Llame al 911 en cualquier momento que piense que puede necesitar atención de [place]. Por ejemplo, llame si:    · Tiene síntomas de un problema del corazón. Estos pueden incluir:  ? Kelvin Bachelor en el pecho. ? Graves dificultades para respirar. ? Latidos cardíacos rápidos o irregulares. ? Aturdimiento o debilidad repentina. ? Toser mucosidad espumosa y de ROD [place]. ? Desmayos.   Cuando llame al 911, quizás la [employer] diga que mastique 1 aspirina para adultos o de 2 a 4 aspirinas de dosis baja. Espere a la ambulancia. No trate de conducir usted mismo.     · Tiene síntomas de un ataque cerebral. Estos pueden incluir:  ? Entumecimiento, hormigueo, debilitamiento o pérdida de movimiento repentinos en la valentina, el brazo o la pierna, sobre todo en un solo lado del cuerpo. ? Cambios repentinos en la visión. ? Dificultades repentinas para hablar. ? Confusión repentina o súbita dificultad para comprender frases sencillas. ? Problemas repentinos para caminar o mantener el equilibrio. ? Dolor de [place] intenso y repentino, distinto de los sudarshan de delmi anteriores.     · Se desmayó (perdió el conocimiento) Nadeem Narayan. Vigile de cerca los cambios en saez varun y asegúrese de comunicarse con saez médico si:    · No mejora evaristo se esperaba. ¿Dónde puede encontrar más información en inglés? Vaya a http://www.gray.com/  Anthony M629 en la búsqueda para aprender más acerca de \"Desmayos: Instrucciones de cuidado. \"  Revisado: 1 julio, 6166               RFWGQGM del contenido: 13.0  © 2006-2021 Healthwise, Incorporated. Las instrucciones de cuidado fueron adaptadas bajo licencia por [employer] (which disclaims liability or warranty for this information). Si usted tiene [place] afección médica o sobre estas instrucciones, siempre pregunte a saez profesional de varun. Healthwise, Incorporated niega toda garantía o responsabilidad por saez uso de esta información.

## 2022-02-16 NOTE — DISCHARGE SUMMARY
Hospitalist Discharge Summary   Admit Date:  2/15/2022  6:22 AM   DC Note date: 2022  Name:  Balaji Sesay   Age:  35 y.o. Sex:  male  :  1980   MRN:  [id number]   Room:  [facility]  PCP:  Shikha Gresham NP    Presenting Complaint: Dizziness and Melena    Initial Admission Diagnosis: Acute blood loss anemia [D62]     Problem List for this Hospitalization:  Hospital Problems as of 2022 Date Reviewed: 2022          Codes Class Noted - Resolved POA    * (Principal) RESOLVED: Acute blood loss anemia ICD-10-CM: D62  ICD-9-CM: 285.1  2/15/2022 - 2022 Yes        RESOLVED: Syncope ICD-10-CM: R55  ICD-9-CM: 780.2  2/15/2022 - 2022 Yes        RESOLVED: Melena ICD-10-CM: K92.1  ICD-9-CM: 578.1  2/15/2022 - 2022 Yes        RESOLVED: Upper GI bleed ICD-10-CM: K92.2  ICD-9-CM: 578.9  2/15/2022 - 2022 Yes            Did Patient have Sepsis (YES OR NO): No.    Hospital Course:  Kathrine Eugene is a 35 y. o. male with a history of kidney stones several years ago who was initially seen in the ER on  with c/o dizziness and syncope. CT head was notable only for some possible small calcifications thought to be vascular in origin. His Hb that day was 10.9 with a normal MCV and platelet count. He returned to the ER on 2/15 with recurrent syncopal episodes. About 1 month ago he started taking spirulina supplements to help lose weight. His Hb today is 7.4, down 2.5g in 3 days. His EKG showed NSR with a 1st degree AVB. FOBT was positive. GI was consulted. EGD 2/15 and colonoscopy  were both normal. His Hb is stable at 8.6 today and he has not had any further syncope or pre-syncope and no abnormalities noted on remote telemetry. No further melena. Avoid NSAIDs, con't with PO PPI. Will follow up with GI outpatient. His hospital course was otherwise unremarkable and he is medically stable for discharge home today with outpatient follow up.     Disposition: Home or Self Care  Diet: ADULT DIET Regular  Code Status: Full Code    Follow Up Orders:  No orders of the defined types were placed in this encounter. Follow-up Information     Follow up With Specialties Details Why Bridger Resendiz MD Gastroenterology   [address] [phone]         Hospital follow up. GI bleed. Follow up labs/diagnostics (ultimately defer to outpatient provider):  N/A    Time spent in patient discharge and coordination 35 minutes. Plan was discussed with patient, RN, CM. All questions answered. Patient was stable at time of discharge. Instructions given to call a physician or return if any concerns. Discharge Info:   Current Discharge Medication List      START taking these medications    Details   pantoprazole (PROTONIX) 40 mg tablet Take 1 Tablet by mouth two (2) times a day for 60 days. Qty: 60 Tablet, Refills: 1  Start date: 2/16/2022, End date: 4/17/2022         CONTINUE these medications which have NOT CHANGED    Details   ondansetron (ZOFRAN ODT) 4 mg disintegrating tablet Take 1 Tablet by mouth every eight (8) hours as needed for Nausea or Vomiting. Qty: 10 Tablet, Refills: 0         STOP taking these medications       omeprazole (PRILOSEC) 20 mg capsule Comments:   Reason for Stopping:               Procedures done this admission:  Procedure(s) with comments:  COLONOSCOPY/BMI 27 Room 339/[place] - Colonoscopy    Consults this admission:  None    Echocardiogram/EKG results:  No results found for this or any previous visit.        EKG Results     Procedure 720 Value Units Date/Time    EKG (Check If Upper Abdominal Pain or symptoms of SOB, Diaphoresis, or Tachycardia) [[id number]] Collected: 02/15/22 0644    Order Status: Completed Updated: 02/16/22 0815     Ventricular Rate 76 BPM      Atrial Rate 76 BPM      P-R Interval 268 ms      QRS Duration 84 ms      Q-T Interval 368 ms      QTC Calculation (Bezet) 414 ms Calculated P Axis 66 degrees      Calculated R Axis 37 degrees      Calculated T Axis 3 degrees      Diagnosis --     !! AGE AND GENDER SPECIFIC ECG ANALYSIS !! Sinus rhythm with 1st degree A-V block  inferior NSSTTW changes    Confirmed by Karen Car MD ()JUANITA ([id number]) on 2/16/2022 8:15:31 AM            Diagnostic Imaging/Tests:   No results found. All Micro Results     Procedure Component Value Units Date/Time    COVID-19 RAPID TEST [[id number]] Collected: 02/15/22 0942    Order Status: Completed Specimen: Nasopharyngeal Updated: 02/15/22 1007     Specimen source Nasopharyngeal        COVID-19 rapid test Not detected        Comment:      The specimen is NEGATIVE for SARS-CoV-2, the novel coronavirus associated with COVID-19. A negative result does not rule out COVID-19. This test has been authorized by the FDA under an Emergency Use Authorization (EUA) for use by authorized laboratories.         Fact sheet for Healthcare Providers: Medical Predictive [employer] Corporationdate.co.nz  Fact sheet for Patients: A.C. Moore.co.nz       Methodology: Isothermal Nucleic Acid Amplification               Labs: Results:       BMP, Mg, Phos Recent Labs     02/15/22  0719 02/15/22  0650   NA  --  140   K  --  4.3   CL  --  110*   CO2  --  27   AGAP  --  3*   BUN  --  30*   CREA  --  0.86   CA  --  8.2*   GLU  --  134*   MG 2.2  --       CBC Recent Labs     02/16/22  0525 02/15/22  0650   WBC 9.0 11.4*   RBC 2.75* 2.38*   HGB 8.3* 7.4*   HCT 25.4* 22.2*    289   GRANS  --  73   LYMPH  --  17   EOS  --  0*   MONOS  --  6   BASOS  --  0   IG  --  3   ANEU  --  8.4*   ABL  --  2.0   ANTHONY  --  0.0   ABM  --  0.7   ABB  --  0.0   AIG  --  0.3      LFT Recent Labs     02/15/22  0650   ALT 33   AP 54   TP 6.0*   ALB 3.1*   GLOB 2.9   AGRAT 1.1*      Cardiac Testing No results found for: BNPP, BNP, CPK, RCK1, RCK2, RCK3, RCK4, CKMB, CKNDX, CKND1, TROPT, TROIQ   Coagulation Tests Lab Results Component Value Date/Time    Prothrombin time 13.6 02/15/2022 07:19 AM    INR 1.0 02/15/2022 07:19 AM      A1c No results found for: HBA1C, [id number]   Lipid Panel No results found for: CHOL, CHOLPOCT, CHOLX, CHLST, CHOLV, [id number], HDL, HDLP, LDL, LDLC, DLDLP, [id number], VLDLC, VLDL, TGLX, TRIGL, TRIGP, TGLPOCT, CHHD, CHHDX   Thyroid Panel No results found for: TSH, T4, FT4, TT3, T3U, TSHEXT     Most Recent UA No results found for: COLOR, APPRN, REFSG, JOSE, PROTU, GLUCU, KETU, BILU, BLDU, UROU, AUGUSTA, LEUKU, WBCU, RBCU, UEPI, BACTU, CASTS, UCRY, MUCUS, UCOM       All Labs from Last 24 Hrs:  Recent Results (from the past 24 hour(s))   CBC W/O DIFF    Collection Time: 02/16/22  5:25 AM   Result Value Ref Range    WBC 9.0 4.3 - 11.1 K/uL    RBC 2.75 (L) 4.23 - 5.6 M/uL    HGB 8.3 (L) 13.6 - 17.2 g/dL    HCT 25.4 (L) 41.1 - 50.3 %    MCV 92.4 79.6 - 97.8 FL    MCH 30.2 26.1 - 32.9 PG    MCHC 32.7 31.4 - 35.0 g/dL    RDW 14.6 11.9 - 14.6 %    PLATELET 738 900 - 352 K/uL    MPV 8.7 (L) 9.4 - 12.3 FL    ABSOLUTE NRBC 0.02 0.0 - 0.2 K/uL       Current Med List in Hospital:   Current Facility-Administered Medications   Medication Dose Route Frequency    sodium chloride (NS) flush 5-10 mL  5-10 mL IntraVENous Q8H    sodium chloride (NS) flush 5-10 mL  5-10 mL IntraVENous PRN    0.9% sodium chloride infusion 250 mL  250 mL IntraVENous PRN    pantoprazole (PROTONIX) 40 mg in 0.9% sodium chloride 10 mL injection  40 mg IntraVENous Q12H    sodium chloride (NS) flush 5-40 mL  5-40 mL IntraVENous Q8H    sodium chloride (NS) flush 5-40 mL  5-40 mL IntraVENous PRN    ondansetron (ZOFRAN) injection 4 mg  4 mg IntraVENous Q4H PRN       No Known Allergies    There is no immunization history on file for this patient.     Recent Vital Data:  Patient Vitals for the past 24 hrs:   Temp Pulse Resp BP SpO2   02/16/22 1408 97.8 °F (36.6 °C) 65 16 124/79 95 %   02/16/22 1345  66 16 115/70 100 %   02/16/22 1340  65 16 114/72 100 %   02/16/22 1335  68 16 119/68 100 %   02/16/22 1330  67 16 107/63 100 %   02/16/22 1325  67 16 108/64 100 %   02/16/22 1320  69 16 (!) 105/56 100 %   02/16/22 1317 98.6 °F (37 °C) 76 16 (!) 105/54 99 %   02/16/22 1253  69 16 121/75 100 %   02/16/22 0744 98.7 °F (37.1 °C) 72 16 123/73 99 %   02/16/22 0400  68      02/16/22 0323 98.1 °F (36.7 °C) 69 16 114/70 98 %   02/16/22 0000  93      02/15/22 2325 98.3 °F (36.8 °C) 69 16 117/70 97 %   02/15/22 1955 98.3 °F (36.8 °C) 81 16 133/78 99 %   02/15/22 1600  84        Oxygen Therapy  O2 Sat (%): 95 % (02/16/22 1408)  Pulse via Oximetry: 66 beats per minute (02/16/22 1345)  O2 Device: None (Room air) (02/16/22 1345)  Skin Assessment: Clean, dry, & intact (02/16/22 1253)  Skin Protection for O2 Device: No (02/16/22 1253)  O2 Flow Rate (L/min): 2 l/min (02/16/22 1325)  ETCO2 (mmHg): 36 mmHg (02/16/22 1253)    Estimated body mass index is 27 kg/m² as calculated from the following:    Height as of 2/12/22: 5' 6.93\" (1.7 m). Weight as of 2/12/22: 78 kg (172 lb). Intake/Output Summary (Last 24 hours) at 2/16/2022 1451  Last data filed at 2/16/2022 1311  Gross per 24 hour   Intake 400 ml   Output 0 ml   Net 400 ml         Physical Exam:  General:    Well nourished. No overt distress. Overweight. Head:  Normocephalic, atraumatic  Eyes:  Sclerae appear normal.  Pupils equally round. HENT:  Nares appear normal, no drainage. Moist mucous membranes  Neck:  No restricted ROM. Trachea midline  CV:   RRR. No m/r/g. No JVD  Lungs:   CTAB. No wheezing, rhonchi, or rales. Even, unlabored  Abdomen:   Soft, nontender, nondistended. Extremities: Warm and dry. No cyanosis or clubbing. No edema. Skin:     No rashes. Normal coloration  Neuro:  CN II-XII grossly intact. Psych:  Normal mood and affect. Signed:  Juli Nguyen MD    Part of this note may have been written by using a voice dictation software.   The note has been proof read but may still contain some grammatical/other typographical errors.

## 2022-02-16 NOTE — PERIOP NOTES
TRANSFER - IN REPORT:    Verbal report received from [facility]) on Melba Mercado [phone]  being received from 3rd floor(unit) for routine progression of care      Report consisted of patients Situation, Background, Assessment and   Recommendations(SBAR). Information from the following report(s) SBAR and MAR was reviewed with the receiving nurse. Opportunity for questions and clarification was provided. Assessment completed upon patients arrival to unit and care assumed.

## 2022-02-16 NOTE — PERIOP NOTES
TRANSFER - OUT REPORT:    Verbal report given to Grant Rogers RN on Melba Shah5  being transferred to Room 339 for routine progression of care       Report consisted of patients Situation, Background, Assessment and   Recommendations(SBAR). Information from the following report(s) SBAR, Procedure Summary, Intake/Output, MAR, Recent Results and Cardiac Rhythm SR was reviewed with the receiving nurse. Lines:   Peripheral IV 02/15/22 Right Antecubital (Active)   Site Assessment Clean, dry, & intact 02/16/22 1317   Phlebitis Assessment 0 02/16/22 1317   Infiltration Assessment 0 02/16/22 1317   Dressing Status Clean, dry, & intact 02/16/22 1317   Dressing Type Tape;Transparent 02/16/22 1317   Hub Color/Line Status Pink; Infusing;Patent 02/16/22 1317        Opportunity for questions and clarification was provided.       Patient transported with:   room air

## 2022-02-16 NOTE — ANESTHESIA POSTPROCEDURE EVALUATION
Procedure(s):  COLONOSCOPY/BMI 27 Room 339/[place].    total IV anesthesia    Anesthesia Post Evaluation      Multimodal analgesia: multimodal analgesia used between 6 hours prior to anesthesia start to PACU discharge  Patient location during evaluation: bedside  Patient participation: complete - patient participated  Level of consciousness: awake  Pain management: adequate  Airway patency: patent  Anesthetic complications: no  Cardiovascular status: acceptable and stable  Respiratory status: acceptable and room air  Hydration status: acceptable  Post anesthesia nausea and vomiting:  none  Final Post Anesthesia Temperature Assessment:  Normothermia (36.0-37.5 degrees C)      INITIAL Post-op Vital signs:   Vitals Value Taken Time   /56 02/16/22 1320   Temp 37 °C (98.6 °F) 02/16/22 1317   Pulse 67 02/16/22 1324   Resp 16 02/16/22 1320   SpO2 100 % 02/16/22 1324   Vitals shown include unvalidated device data.

## 2022-03-02 NOTE — ANESTHESIA PREPROCEDURE EVALUATION
Relevant Problems   HEMATOLOGY   (+) Acute blood loss anemia       Anesthetic History   No history of anesthetic complications            Review of Systems / Medical History  Patient summary reviewed, nursing notes reviewed and pertinent labs reviewed    Pulmonary  Within defined limits                 Neuro/Psych   Within defined limits           Cardiovascular                  Exercise tolerance: >4 METS     GI/Hepatic/Renal         Renal disease: stones      Comments: GI bleed Endo/Other        Anemia (Hb 7.4)     Other Findings            Physical Exam    Airway  Mallampati: II  TM Distance: 4 - 6 cm  Neck ROM: normal range of motion   Mouth opening: Normal     Cardiovascular  Regular rate and rhythm,  S1 and S2 normal,  no murmur, click, rub, or gallop             Dental  No notable dental hx       Pulmonary  Breath sounds clear to auscultation               Abdominal         Other Findings            Anesthetic Plan    ASA: 2, emergent            Induction: Intravenous  Anesthetic plan and risks discussed with: Patient      Unit PRBC transfusing non-hypoxemic  will dose lovenox per active  covid protocol pending d-dimer non-hypoxemic  will dose lovenox per active  covid protocol pending d-dimer no hypoxia no hypoxia no hypoxia no hypoxia no hypoxia no hypoxia non-hypoxemic  will dose lovenox per active  covid protocol pending d-dimer non-hypoxemic  will dose lovenox per active  covid protocol pending d-dimer no hypoxia non-hypoxemic  will dose lovenox per active  covid protocol pending d-dimer no hypoxia no hypoxia no hypoxia no hypoxia non-hypoxemic  will dose lovenox per active  covid protocol pending d-dimer no hypoxia no hypoxia no hypoxia no hypoxia non-hypoxemic  will dose lovenox per active  covid protocol pending d-dimer no hypoxia non-hypoxemic  will dose lovenox per active  covid protocol pending d-dimer non-hypoxemic  will dose lovenox per active  covid protocol pending d-dimer no hypoxia non-hypoxemic  will dose lovenox per active  covid protocol pending d-dimer non-hypoxemic  will dose lovenox per active  covid protocol pending d-dimer no hypoxia no hypoxia non-hypoxemic  will dose lovenox per active  covid protocol pending d-dimer no hypoxia non-hypoxemic  will dose lovenox per active  covid protocol pending d-dimer no hypoxia no hypoxia no hypoxia no hypoxia no hypoxia non-hypoxemic  will dose lovenox per active  covid protocol pending d-dimer non-hypoxemic  will dose lovenox per active  covid protocol pending d-dimer no hypoxia non-hypoxemic  will dose lovenox per active  covid protocol pending d-dimer non-hypoxemic  will dose lovenox per active  covid protocol pending d-dimer non-hypoxemic  will dose lovenox per active  covid protocol pending d-dimer no hypoxia no hypoxia non-hypoxemic  will dose lovenox per active  covid protocol pending d-dimer no hypoxia no hypoxia

## 2024-03-21 ENCOUNTER — HOSPITAL ENCOUNTER (EMERGENCY)
Age: 44
Discharge: HOME | End: 2024-03-21
Attending: EMERGENCY MEDICINE

## 2024-03-21 VITALS
RESPIRATION RATE: 17 BRPM | TEMPERATURE: 97.6 F | WEIGHT: 180 LBS | BODY MASS INDEX: 29.99 KG/M2 | SYSTOLIC BLOOD PRESSURE: 159 MMHG | OXYGEN SATURATION: 98 % | DIASTOLIC BLOOD PRESSURE: 94 MMHG | HEIGHT: 65 IN | HEART RATE: 64 BPM

## 2024-03-21 DIAGNOSIS — R10.32 LEFT INGUINAL PAIN: ICD-10-CM

## 2024-03-21 DIAGNOSIS — M62.838 SPASM OF MUSCLE: Primary | ICD-10-CM

## 2024-03-21 LAB
ANION GAP SERPL CALC-SCNC: 7 MMOL/L (ref 2–11)
BASOPHILS # BLD: 0 K/UL (ref 0–0.2)
BASOPHILS NFR BLD: 1 % (ref 0–2)
BILIRUB UR QL: NEGATIVE
BUN SERPL-MCNC: 10 MG/DL (ref 6–23)
CALCIUM SERPL-MCNC: 9.4 MG/DL (ref 8.3–10.4)
CHLORIDE SERPL-SCNC: 106 MMOL/L (ref 103–113)
CK SERPL-CCNC: 501 U/L (ref 21–215)
CO2 SERPL-SCNC: 26 MMOL/L (ref 21–32)
CREAT SERPL-MCNC: 1.15 MG/DL (ref 0.8–1.5)
DIFFERENTIAL METHOD BLD: ABNORMAL
EOSINOPHIL # BLD: 0 K/UL (ref 0–0.8)
EOSINOPHIL NFR BLD: 1 % (ref 0.5–7.8)
ERYTHROCYTE [DISTWIDTH] IN BLOOD BY AUTOMATED COUNT: 13.3 % (ref 11.9–14.6)
GLUCOSE SERPL-MCNC: 117 MG/DL (ref 65–100)
GLUCOSE UR QL STRIP.AUTO: NEGATIVE MG/DL
HCT VFR BLD AUTO: 49.4 % (ref 41.1–50.3)
HGB BLD-MCNC: 16.7 G/DL (ref 13.6–17.2)
IMM GRANULOCYTES # BLD AUTO: 0 K/UL (ref 0–0.5)
IMM GRANULOCYTES NFR BLD AUTO: 1 % (ref 0–5)
KETONES UR-MCNC: NEGATIVE MG/DL
LEUKOCYTE ESTERASE UR QL STRIP: NEGATIVE
LYMPHOCYTES # BLD: 1.4 K/UL (ref 0.5–4.6)
LYMPHOCYTES NFR BLD: 21 % (ref 13–44)
MAGNESIUM SERPL-MCNC: 2.6 MG/DL (ref 1.8–2.4)
MCH RBC QN AUTO: 29.6 PG (ref 26.1–32.9)
MCHC RBC AUTO-ENTMCNC: 33.8 G/DL (ref 31.4–35)
MCV RBC AUTO: 87.6 FL (ref 82–102)
MONOCYTES # BLD: 0.5 K/UL (ref 0.1–1.3)
MONOCYTES NFR BLD: 7 % (ref 4–12)
NEUTS SEG # BLD: 4.4 K/UL (ref 1.7–8.2)
NEUTS SEG NFR BLD: 70 % (ref 43–78)
NITRITE UR QL: NEGATIVE
NRBC # BLD: 0 K/UL (ref 0–0.2)
PH UR: 7 (ref 5–9)
PLATELET # BLD AUTO: 313 K/UL (ref 150–450)
PMV BLD AUTO: 8.6 FL (ref 9.4–12.3)
POTASSIUM SERPL-SCNC: 5.1 MMOL/L (ref 3.5–5.1)
PROT UR QL: NEGATIVE MG/DL
RBC # BLD AUTO: 5.64 M/UL (ref 4.23–5.6)
RBC # UR STRIP: NEGATIVE
SERVICE CMNT-IMP: NORMAL
SODIUM SERPL-SCNC: 139 MMOL/L (ref 136–146)
SP GR UR: 1.01 (ref 1–1.02)
UROBILINOGEN UR QL: 0.2 EU/DL (ref 0.2–1)
WBC # BLD AUTO: 6.3 K/UL (ref 4.3–11.1)

## 2024-03-21 PROCEDURE — 6370000000 HC RX 637 (ALT 250 FOR IP): Performed by: EMERGENCY MEDICINE

## 2024-03-21 PROCEDURE — 83735 ASSAY OF MAGNESIUM: CPT

## 2024-03-21 PROCEDURE — 81003 URINALYSIS AUTO W/O SCOPE: CPT

## 2024-03-21 PROCEDURE — 82550 ASSAY OF CK (CPK): CPT

## 2024-03-21 PROCEDURE — 99283 EMERGENCY DEPT VISIT LOW MDM: CPT

## 2024-03-21 PROCEDURE — 80048 BASIC METABOLIC PNL TOTAL CA: CPT

## 2024-03-21 PROCEDURE — 85025 COMPLETE CBC W/AUTO DIFF WBC: CPT

## 2024-03-21 RX ORDER — IBUPROFEN 800 MG/1
800 TABLET ORAL EVERY 8 HOURS PRN
Qty: 21 TABLET | Refills: 0 | Status: SHIPPED | OUTPATIENT
Start: 2024-03-21

## 2024-03-21 RX ORDER — CYCLOBENZAPRINE HCL 10 MG
10 TABLET ORAL 3 TIMES DAILY PRN
Qty: 21 TABLET | Refills: 0 | Status: SHIPPED | OUTPATIENT
Start: 2024-03-21 | End: 2024-03-28

## 2024-03-21 RX ORDER — TRAMADOL HYDROCHLORIDE 50 MG/1
100 TABLET ORAL
Status: COMPLETED | OUTPATIENT
Start: 2024-03-21 | End: 2024-03-21

## 2024-03-21 RX ORDER — CYCLOBENZAPRINE HCL 10 MG
10 TABLET ORAL
Status: COMPLETED | OUTPATIENT
Start: 2024-03-21 | End: 2024-03-21

## 2024-03-21 RX ORDER — IBUPROFEN 800 MG/1
800 TABLET ORAL
Status: DISCONTINUED | OUTPATIENT
Start: 2024-03-21 | End: 2024-03-21

## 2024-03-21 RX ORDER — KETOROLAC TROMETHAMINE 30 MG/ML
30 INJECTION, SOLUTION INTRAMUSCULAR; INTRAVENOUS
Status: DISCONTINUED | OUTPATIENT
Start: 2024-03-21 | End: 2024-03-21 | Stop reason: HOSPADM

## 2024-03-21 RX ADMIN — TRAMADOL HYDROCHLORIDE 100 MG: 50 TABLET ORAL at 09:44

## 2024-03-21 RX ADMIN — CYCLOBENZAPRINE 10 MG: 10 TABLET, FILM COATED ORAL at 09:44

## 2024-03-21 ASSESSMENT — ENCOUNTER SYMPTOMS
BACK PAIN: 0
NAUSEA: 0
SHORTNESS OF BREATH: 0
EYE REDNESS: 0
ABDOMINAL PAIN: 0
VOMITING: 0
COUGH: 0
COLOR CHANGE: 0
FACIAL SWELLING: 0
EYE DISCHARGE: 0
DIARRHEA: 0

## 2024-03-21 ASSESSMENT — PAIN - FUNCTIONAL ASSESSMENT: PAIN_FUNCTIONAL_ASSESSMENT: 0-10

## 2024-03-21 ASSESSMENT — PAIN SCALES - GENERAL: PAINLEVEL_OUTOF10: 7

## 2024-03-21 NOTE — DISCHARGE INSTRUCTIONS
Return to the ER if worse  Be sure to drink plenty of fluids throughout the day    I would be cautious of any \"friends\" giving me an injection of medicine, if they are not a healthcare professional

## 2024-03-21 NOTE — ED TRIAGE NOTES
Pt ambulatory with c/o left side groin pain since yesterday. Pt denies trouble urinating/hematuria. Pt reports history of kidney stones and states this feels the same.

## 2024-03-21 NOTE — ED PROVIDER NOTES
Emergency Department Provider Note       PCP: Armando Davey, APRN - NP   Age: 43 y.o.   Sex: male     DISPOSITION  03/21/2024 09:19:18 AM       ICD-10-CM    1. Spasm of muscle  M62.838       2. Left inguinal pain  R10.32           Medical Decision Making     Episodic brief lancinating pain to medial thigh seems like brief muscle spasm, labs look good, differential diagnosis to include DVT, sciatica, treat with muscle relaxers and NSAIDs.     1 acute, uncomplicated illness or injury.  Prescription drug management performed.  Patient was discharged risks and benefits of hospitalization were considered.  Shared medical decision making was utilized in creating the patients health plan today.    I independently ordered and reviewed each unique test.                     History     43-year-old gentleman presents to the ER for left groin/medial thigh pain onset yesterday.  Pain is episodic lasting 4 to 5 seconds per episode.  He had 3 spells yesterday 2 spells this morning, and 1 spell just after I completed my examination of him.  Yesterday morning he noticed a subjective fever and headache so a \"friend\" (is not clear how much of a medical professional this was) gave him an \"injection\" (with an unknown medicine) to the right deltoid.  Shortly after the shot was headache and fever essentially went away.    There is no abdominal pain or flank pain with thigh pain no testicular pain or swelling.  Per the triage note patient indicated it feels somewhat like his previous kidney stone pain however I think that is a comparison of the intensity or severity of pain the type or location.  No additional remedies taken for the thigh pain.  No history of DVT, patient has no symptoms between the brief episodic twinges of pain.  He has not taken any specific remedies for the leg pain.  He denies any trauma or overuse syndrome.  There is no numbness to the leg      ROS     Review of Systems   Constitutional:  Negative for chills

## 2024-03-21 NOTE — ED NOTES
I have reviewed discharge instructions with the patient.  The patient verbalized understanding.    Patient left ED via Discharge Method: ambulatory to Home with male friend/family member.    Opportunity for questions and clarification provided.       Patient given 2 scripts.         To continue your aftercare when you leave the hospital, you may receive an automated call from our care team to check in on how you are doing.  This is a free service and part of our promise to provide the best care and service to meet your aftercare needs.” If you have questions, or wish to unsubscribe from this service please call [phone].  Thank you for Choosing our [facility] Emergency Department.

## 2024-08-13 ENCOUNTER — HOSPITAL ENCOUNTER (EMERGENCY)
Age: 44
Discharge: HOME | End: 2024-08-13

## 2024-08-13 ENCOUNTER — APPOINTMENT (OUTPATIENT)
Dept: ULTRASOUND IMAGING | Age: 44
End: 2024-08-13

## 2024-08-13 ENCOUNTER — APPOINTMENT (OUTPATIENT)
Dept: CT IMAGING | Age: 44
End: 2024-08-13

## 2024-08-13 VITALS
WEIGHT: 180 LBS | BODY MASS INDEX: 29.95 KG/M2 | RESPIRATION RATE: 14 BRPM | OXYGEN SATURATION: 100 % | DIASTOLIC BLOOD PRESSURE: 98 MMHG | TEMPERATURE: 98.1 F | HEART RATE: 68 BPM | SYSTOLIC BLOOD PRESSURE: 152 MMHG

## 2024-08-13 DIAGNOSIS — N43.3 BILATERAL HYDROCELE: Primary | ICD-10-CM

## 2024-08-13 DIAGNOSIS — R10.2 SUPRAPUBIC PAIN: ICD-10-CM

## 2024-08-13 DIAGNOSIS — R30.0 DYSURIA: ICD-10-CM

## 2024-08-13 DIAGNOSIS — N50.3 EPIDIDYMAL CYST: ICD-10-CM

## 2024-08-13 LAB
ALBUMIN SERPL-MCNC: 4.1 G/DL (ref 3.5–5)
ALBUMIN/GLOB SERPL: 1.3 (ref 1–1.9)
ALP SERPL-CCNC: 74 U/L (ref 40–129)
ALT SERPL-CCNC: 43 U/L (ref 12–65)
ANION GAP SERPL CALC-SCNC: 13 MMOL/L (ref 9–18)
APPEARANCE UR: CLEAR
AST SERPL-CCNC: 25 U/L (ref 15–37)
BASOPHILS # BLD: 0 K/UL (ref 0–0.2)
BASOPHILS NFR BLD: 1 % (ref 0–2)
BILIRUB SERPL-MCNC: 0.4 MG/DL (ref 0–1.2)
BILIRUB UR QL: NEGATIVE
BILIRUB UR QL: NEGATIVE
BUN SERPL-MCNC: 12 MG/DL (ref 6–23)
CALCIUM SERPL-MCNC: 9.5 MG/DL (ref 8.8–10.2)
CHLORIDE SERPL-SCNC: 102 MMOL/L (ref 98–107)
CO2 SERPL-SCNC: 24 MMOL/L (ref 20–28)
COLOR UR: NORMAL
CREAT SERPL-MCNC: 0.96 MG/DL (ref 0.8–1.3)
DIFFERENTIAL METHOD BLD: ABNORMAL
EOSINOPHIL # BLD: 0 K/UL (ref 0–0.8)
EOSINOPHIL NFR BLD: 1 % (ref 0.5–7.8)
ERYTHROCYTE [DISTWIDTH] IN BLOOD BY AUTOMATED COUNT: 13.3 % (ref 11.9–14.6)
GLOBULIN SER CALC-MCNC: 3.2 G/DL (ref 2.3–3.5)
GLUCOSE SERPL-MCNC: 127 MG/DL (ref 70–99)
GLUCOSE UR QL STRIP.AUTO: NEGATIVE MG/DL
GLUCOSE UR STRIP.AUTO-MCNC: NEGATIVE MG/DL
HCT VFR BLD AUTO: 48.3 % (ref 41.1–50.3)
HGB BLD-MCNC: 16.1 G/DL (ref 13.6–17.2)
HGB UR QL STRIP: NEGATIVE
IMM GRANULOCYTES # BLD AUTO: 0.1 K/UL (ref 0–0.5)
IMM GRANULOCYTES NFR BLD AUTO: 1 % (ref 0–5)
KETONES UR QL STRIP.AUTO: NEGATIVE MG/DL
KETONES UR-MCNC: NEGATIVE MG/DL
LEUKOCYTE ESTERASE UR QL STRIP.AUTO: NEGATIVE
LEUKOCYTE ESTERASE UR QL STRIP: NEGATIVE
LIPASE SERPL-CCNC: 40 U/L (ref 13–60)
LYMPHOCYTES # BLD: 1.3 K/UL (ref 0.5–4.6)
LYMPHOCYTES NFR BLD: 21 % (ref 13–44)
MCH RBC QN AUTO: 29.8 PG (ref 26.1–32.9)
MCHC RBC AUTO-ENTMCNC: 33.3 G/DL (ref 31.4–35)
MCV RBC AUTO: 89.3 FL (ref 82–102)
MONOCYTES # BLD: 0.4 K/UL (ref 0.1–1.3)
MONOCYTES NFR BLD: 7 % (ref 4–12)
NEUTS SEG # BLD: 4.3 K/UL (ref 1.7–8.2)
NEUTS SEG NFR BLD: 71 % (ref 43–78)
NITRITE UR QL STRIP.AUTO: NEGATIVE
NITRITE UR QL: NEGATIVE
NRBC # BLD: 0 K/UL (ref 0–0.2)
PH UR STRIP: 6 (ref 5–9)
PH UR: 6 (ref 5–9)
PLATELET # BLD AUTO: 295 K/UL (ref 150–450)
PMV BLD AUTO: 8.6 FL (ref 9.4–12.3)
POTASSIUM SERPL-SCNC: 4.5 MMOL/L (ref 3.5–5.1)
PROT SERPL-MCNC: 7.4 G/DL (ref 6.3–8.2)
PROT UR QL: NEGATIVE MG/DL
PROT UR STRIP-MCNC: NEGATIVE MG/DL
RBC # BLD AUTO: 5.41 M/UL (ref 4.23–5.6)
RBC # UR STRIP: NEGATIVE
SERVICE CMNT-IMP: NORMAL
SODIUM SERPL-SCNC: 139 MMOL/L (ref 136–145)
SP GR UR REFRACTOMETRY: 1.02 (ref 1–1.02)
SP GR UR: 1.02 (ref 1–1.02)
UROBILINOGEN UR QL STRIP.AUTO: 0.2 EU/DL (ref 0.2–1)
UROBILINOGEN UR QL: 0.2 EU/DL (ref 0.2–1)
WBC # BLD AUTO: 6.1 K/UL (ref 4.3–11.1)

## 2024-08-13 PROCEDURE — 87491 CHLMYD TRACH DNA AMP PROBE: CPT

## 2024-08-13 PROCEDURE — 74177 CT ABD & PELVIS W/CONTRAST: CPT

## 2024-08-13 PROCEDURE — 80053 COMPREHEN METABOLIC PANEL: CPT

## 2024-08-13 PROCEDURE — 85025 COMPLETE CBC W/AUTO DIFF WBC: CPT

## 2024-08-13 PROCEDURE — 6360000004 HC RX CONTRAST MEDICATION: Performed by: NURSE PRACTITIONER

## 2024-08-13 PROCEDURE — 96375 TX/PRO/DX INJ NEW DRUG ADDON: CPT

## 2024-08-13 PROCEDURE — 96361 HYDRATE IV INFUSION ADD-ON: CPT

## 2024-08-13 PROCEDURE — 96374 THER/PROPH/DIAG INJ IV PUSH: CPT

## 2024-08-13 PROCEDURE — 76870 US EXAM SCROTUM: CPT

## 2024-08-13 PROCEDURE — 2580000003 HC RX 258: Performed by: NURSE PRACTITIONER

## 2024-08-13 PROCEDURE — 87591 N.GONORRHOEAE DNA AMP PROB: CPT

## 2024-08-13 PROCEDURE — 6360000002 HC RX W HCPCS: Performed by: NURSE PRACTITIONER

## 2024-08-13 PROCEDURE — 81003 URINALYSIS AUTO W/O SCOPE: CPT

## 2024-08-13 PROCEDURE — 99285 EMERGENCY DEPT VISIT HI MDM: CPT

## 2024-08-13 PROCEDURE — 83690 ASSAY OF LIPASE: CPT

## 2024-08-13 RX ORDER — OXYCODONE HYDROCHLORIDE 5 MG/1
5 TABLET ORAL
Status: DISCONTINUED | OUTPATIENT
Start: 2024-08-13 | End: 2024-08-13

## 2024-08-13 RX ORDER — KETOROLAC TROMETHAMINE 15 MG/ML
15 INJECTION, SOLUTION INTRAMUSCULAR; INTRAVENOUS ONCE
Status: COMPLETED | OUTPATIENT
Start: 2024-08-13 | End: 2024-08-13

## 2024-08-13 RX ORDER — DOXYCYCLINE HYCLATE 100 MG
100 TABLET ORAL 2 TIMES DAILY
Qty: 14 TABLET | Refills: 0 | Status: SHIPPED | OUTPATIENT
Start: 2024-08-13 | End: 2024-08-20

## 2024-08-13 RX ORDER — 0.9 % SODIUM CHLORIDE 0.9 %
1000 INTRAVENOUS SOLUTION INTRAVENOUS ONCE
Status: COMPLETED | OUTPATIENT
Start: 2024-08-13 | End: 2024-08-13

## 2024-08-13 RX ADMIN — WATER 1000 MG: 1 INJECTION INTRAMUSCULAR; INTRAVENOUS; SUBCUTANEOUS at 13:18

## 2024-08-13 RX ADMIN — KETOROLAC TROMETHAMINE 15 MG: 15 INJECTION, SOLUTION INTRAMUSCULAR; INTRAVENOUS at 11:09

## 2024-08-13 RX ADMIN — IOPAMIDOL 100 ML: 755 INJECTION, SOLUTION INTRAVENOUS at 11:35

## 2024-08-13 RX ADMIN — SODIUM CHLORIDE 1000 ML: 9 INJECTION, SOLUTION INTRAVENOUS at 11:09

## 2024-08-13 ASSESSMENT — PAIN SCALES - GENERAL: PAINLEVEL_OUTOF10: 6

## 2024-08-13 ASSESSMENT — PAIN - FUNCTIONAL ASSESSMENT: PAIN_FUNCTIONAL_ASSESSMENT: 0-10

## 2024-08-13 ASSESSMENT — LIFESTYLE VARIABLES
HOW MANY STANDARD DRINKS CONTAINING ALCOHOL DO YOU HAVE ON A TYPICAL DAY: PATIENT DOES NOT DRINK
HOW OFTEN DO YOU HAVE A DRINK CONTAINING ALCOHOL: NEVER

## 2024-08-13 NOTE — PROGRESS NOTES
Patient/caregivers speak Citizen of [place]  as their preferred language for their healthcare communication. For safe communication, use the AMN  carts or call:    Senior  Navigator Jess Berumen at [phone] or   AMN phone services for [facility] at [phone]    General phone: 258-[facility] ( [phone])  Email: [email or web address]    Always document the use of interpreting services ('s ID number) in your clinical notes.    Our interpreters are available for team members working with limited  English proficient (LEP) patients remotely, via phone or video or in person (if needed for special cases).    When using family members to interpret, for the safety of the patient and protection of the communication of both our patient and [facility] staff the VRI or telephonic  should remain on the line to monitor that all communication is accurate and complete. The  should be instructed to notify [facility] staff immediately if there are any inaccuracies.         Thank you,        Jess WOMACK  Senior /Navigator

## 2024-08-13 NOTE — ED PROVIDER NOTES
Additional Contrast? None    Narrative    CT OF THE ABDOMEN AND PELVIS    INDICATION: Suprapubic abdominal pain and tenderness, left lower quadrant  tenderness.    TECHNIQUE: Multiple 2D axial images were obtained through the abdomen and  pelvis.  Oral contrast was used for bowel opacification.  100mL of Isovue 370  intravenous contrast was used for better evaluation of solid organs and vascular  structures.  Radiation dose reduction techniques were used for this study.  All  CT scans performed at this facility use one or all of the following: Automated  exposure control, adjustment of the mA and/or kVp according to patient's size,  iterative reconstruction.    COMPARISON: None scrotal ultrasound, 8/13/2024, 11:08 a.m.    FINDINGS:  - LUNG BASES: No infiltrates or masses.    - LIVER: Diffuse fatty filtration. No focal mass.  - GALLBLADDER/BILE DUCTS: No gallstones or bile duct dilatation.  - PANCREAS: Normal.  - SPLEEN: Normal.    - ADRENALS: Normal.  - KIDNEYS/URETERS: 5 mm, nonobstructing interpolar left renal calculus. Small  hepatic hypodensities measure up to 0.9 cm, too small to accurately characterize  by density measurements although likely presenting cysts. No hydronephrosis or  significant mass.  - BLADDER: Normal.  - REPRODUCTIVE ORGANS: No pelvic masses. Partially visualized right hydrocele.    - BOWEL: Normal caliber.  No inflammatory changes. The appendix is visualized  and is normal in caliber with no periappendiceal inflammatory change.  - LYMPH NODES: No significant retroperitoneal, mesenteric, or pelvic adenopathy.  - BONES: No fracture or significant bone lesion.  - VASCULATURE: Normal  - OTHER: No ascites.      Impression    Partially visualized right hydrocele.    Fatty liver.    Right renal cysts.    Normal-appearing appendix.    There is no CT evidence of an acute process within the abdomen or pelvis.    If providers have any questions about this report, I can be reached

## 2024-08-17 LAB
C TRACH RRNA SPEC QL NAA+PROBE: NORMAL
N GONORRHOEA RRNA SPEC QL NAA+PROBE: NORMAL
SPECIMEN SOURCE: NORMAL

## 2024-12-02 ENCOUNTER — OFFICE VISIT (OUTPATIENT)
Dept: UROLOGY | Age: 44
End: 2024-12-02

## 2024-12-02 DIAGNOSIS — N20.0 NEPHROLITHIASIS: Primary | ICD-10-CM

## 2024-12-02 LAB
BILIRUBIN, URINE, POC: NEGATIVE
BLOOD URINE, POC: NEGATIVE
GLUCOSE URINE, POC: NEGATIVE MG/DL
KETONES, URINE, POC: NEGATIVE MG/DL
LEUKOCYTE ESTERASE, URINE, POC: NEGATIVE
NITRITE, URINE, POC: NEGATIVE
PH, URINE, POC: 6 (ref 4.6–8)
PROTEIN,URINE, POC: NEGATIVE MG/DL
SPECIFIC GRAVITY, URINE, POC: 1.03 (ref 1–1.03)
URINALYSIS CLARITY, POC: NORMAL
URINALYSIS COLOR, POC: NORMAL
UROBILINOGEN, POC: NORMAL MG/DL

## 2024-12-02 PROCEDURE — 99244 OFF/OP CNSLTJ NEW/EST MOD 40: CPT | Performed by: UROLOGY

## 2024-12-02 PROCEDURE — 81003 URINALYSIS AUTO W/O SCOPE: CPT | Performed by: UROLOGY

## 2024-12-02 PROCEDURE — 74018 RADEX ABDOMEN 1 VIEW: CPT | Performed by: UROLOGY

## 2024-12-02 NOTE — PROGRESS NOTES
[facility]  [address] [place] [address]  [phone]    Bulmaro Borja  : 1980      Chief Complaint   Patient presents with    New Patient        HPI   44 y.o., male who is referred by Dr. Mckenna for evaluation of a L renal stone.  Pt recently seen in ED on 24 for mild L orchalgia, freq and dysuria.  JAYDEN showed a R epididymal cyst and small bilateral hydroceles.  CT showed R renal cysts, fatty liver and a 5mm LMP stone.  KUB today shows a 5mm LMP stone.  Denies any symptoms after discharge from ED.        Past Medical History:   Diagnosis Date    Kidney stone      Past Surgical History:   Procedure Laterality Date    COLONOSCOPY N/A 2022    COLONOSCOPY/BMI 27 Room 339/British performed by Davonte Jaime MD at [facility] ENDOSCOPY    LITHOTRIPSY       Current Outpatient Medications   Medication Sig Dispense Refill    ibuprofen (IBU) 800 MG tablet Take 1 tablet by mouth every 8 hours as needed for Pain 21 tablet 0    ondansetron (ZOFRAN-ODT) 4 MG disintegrating tablet Take 4 mg by mouth every 8 hours as needed (Patient not taking: Reported on 3/21/2024)       No current facility-administered medications for this visit.     No Known Allergies  Social History     Socioeconomic History    Marital status:      Spouse name: Not on file    Number of children: Not on file    Years of education: Not on file    Highest education level: Not on file   Occupational History    Not on file   Tobacco Use    Smoking status: Never    Smokeless tobacco: Not on file   Substance and Sexual Activity    Alcohol use: Not Currently    Drug use: Not on file    Sexual activity: Not on file   Other Topics Concern    Not on file   Social History Narrative    Not on file     Social Determinants of Health     Financial Resource Strain: Not on file   Food Insecurity: Not on file   Transportation Needs: Not on file   Physical Activity: Not on file   Stress: Not on file   Social 
[facility]  [address] [place] [address]  [phone]    Bulmaro Borja  : 1980     HPI   44 y.o., male returns in follow up for ***.      Past Medical History:   Diagnosis Date    Kidney stone      Past Surgical History:   Procedure Laterality Date    COLONOSCOPY N/A 2022    COLONOSCOPY/BMI 27 Room 339/[place] performed by Davonte Jaime MD at [facility] ENDOSCOPY    LITHOTRIPSY       Current Outpatient Medications   Medication Sig Dispense Refill    ibuprofen (IBU) 800 MG tablet Take 1 tablet by mouth every 8 hours as needed for Pain 21 tablet 0    ondansetron (ZOFRAN-ODT) 4 MG disintegrating tablet Take 4 mg by mouth every 8 hours as needed (Patient not taking: Reported on 3/21/2024)       No current facility-administered medications for this visit.     No Known Allergies  Social History     Socioeconomic History    Marital status:      Spouse name: Not on file    Number of children: Not on file    Years of education: Not on file    Highest education level: Not on file   Occupational History    Not on file   Tobacco Use    Smoking status: Never    Smokeless tobacco: Not on file   Substance and Sexual Activity    Alcohol use: Not Currently    Drug use: Not on file    Sexual activity: Not on file   Other Topics Concern    Not on file   Social History Narrative    Not on file     Social Determinants of Health     Financial Resource Strain: Not on file   Food Insecurity: Not on file   Transportation Needs: Not on file   Physical Activity: Not on file   Stress: Not on file   Social Connections: Unknown (2023)    Received from [employer]    Social Connections     Frequency of Communication with Friends and Family: Not asked     Frequency of Social Gatherings with Friends and Family: Not asked   Intimate Partner Violence: Unknown (2023)    Received from [employer]    Intimate Partner Violence     Fear of Current or 
Received from [employer], [facility]    Intimate Partner Violence     Fear of Current or Ex-Partner: Not asked     Emotionally Abused: Not asked     Physically Abused: Not asked     Sexually Abused: Not asked   Housing Stability: Not on file     Family History   Problem Relation Age of Onset    Diabetes Mother        ROS    Urinalysis  UA - Dipstick  Results for orders placed or performed in visit on 12/02/24   AMB POC URINALYSIS DIP STICK AUTO W/O MICRO    Collection Time: 12/02/24  1:23 PM   Result Value Ref Range    Color (UA POC)      Clarity (UA POC)      Glucose, Urine, POC Negative Negative mg/dL    Bilirubin, Urine, POC Negative Negative    KETONES, Urine, POC Negative Negative mg/dL    Specific Gravity, Urine, POC 1.030 1.001 - 1.035    Blood (UA POC) Negative     pH, Urine, POC 6 4.6 - 8.0    Protein, Urine, POC Negative Negative mg/dL    Urobilinogen, POC 0.2 mg/dL <1.1 mg/dL    Nitrite, Urine, POC Negative Negative    Leukocyte Esterase, Urine, POC Negative Negative       UA - Micro  WBC - 0  RBC - 0  Bacteria - 0  Epith - 0        Assessment and Plan    ICD-10-CM    1. Hydrocele, unspecified hydrocele type  N43.3 AMB POC URINALYSIS DIP STICK AUTO W/O MICRO

## 2025-01-23 ENCOUNTER — HOSPITAL ENCOUNTER (EMERGENCY)
Age: 45
Discharge: ANOTHER HOSPITAL | End: 2025-01-23
Attending: STUDENT IN AN ORGANIZED HEALTH CARE EDUCATION/TRAINING PROGRAM

## 2025-01-23 ENCOUNTER — APPOINTMENT (OUTPATIENT)
Dept: GENERAL RADIOLOGY | Age: 45
End: 2025-01-23

## 2025-01-23 ENCOUNTER — HOSPITAL ENCOUNTER (INPATIENT)
Age: 45
LOS: 2 days | Discharge: HOME | End: 2025-01-25
Attending: INTERNAL MEDICINE | Admitting: INTERNAL MEDICINE

## 2025-01-23 VITALS
OXYGEN SATURATION: 100 % | TEMPERATURE: 98.1 F | WEIGHT: 180 LBS | BODY MASS INDEX: 29.99 KG/M2 | HEART RATE: 73 BPM | RESPIRATION RATE: 18 BRPM | SYSTOLIC BLOOD PRESSURE: 111 MMHG | DIASTOLIC BLOOD PRESSURE: 70 MMHG | HEIGHT: 65 IN

## 2025-01-23 DIAGNOSIS — K92.0 HEMATEMESIS WITH NAUSEA: Primary | ICD-10-CM

## 2025-01-23 PROBLEM — K92.2 GI BLEED: Status: ACTIVE | Noted: 2025-01-23

## 2025-01-23 LAB
ALBUMIN SERPL-MCNC: 3.8 G/DL (ref 3.5–5)
ALBUMIN/GLOB SERPL: 1.7 (ref 1–1.9)
ALP SERPL-CCNC: 63 U/L (ref 40–129)
ALT SERPL-CCNC: 27 U/L (ref 8–55)
ANION GAP SERPL CALC-SCNC: 16 MMOL/L (ref 7–16)
AST SERPL-CCNC: 25 U/L (ref 15–37)
BASOPHILS # BLD: 0.05 K/UL (ref 0–0.2)
BASOPHILS NFR BLD: 0.4 % (ref 0–2)
BILIRUB SERPL-MCNC: 0.3 MG/DL (ref 0–1.2)
BUN SERPL-MCNC: 27 MG/DL (ref 6–23)
CALCIUM SERPL-MCNC: 8.9 MG/DL (ref 8.8–10.2)
CHLORIDE SERPL-SCNC: 104 MMOL/L (ref 98–107)
CO2 SERPL-SCNC: 19 MMOL/L (ref 20–29)
CREAT SERPL-MCNC: 1.06 MG/DL (ref 0.8–1.3)
DIFFERENTIAL METHOD BLD: ABNORMAL
EOSINOPHIL # BLD: 0.01 K/UL (ref 0–0.8)
EOSINOPHIL NFR BLD: 0.1 % (ref 0.5–7.8)
ERYTHROCYTE [DISTWIDTH] IN BLOOD BY AUTOMATED COUNT: 14.1 % (ref 11.9–14.6)
GLOBULIN SER CALC-MCNC: 2.2 G/DL (ref 2.3–3.5)
GLUCOSE SERPL-MCNC: 192 MG/DL (ref 70–99)
HCT VFR BLD AUTO: 25.7 % (ref 41.1–50.3)
HCT VFR BLD AUTO: 27.7 % (ref 41.1–50.3)
HGB BLD-MCNC: 8.5 G/DL (ref 13.6–17.2)
HGB BLD-MCNC: 9.4 G/DL (ref 13.6–17.2)
HISTORY CHECK: NORMAL
IMM GRANULOCYTES # BLD AUTO: 0.25 K/UL (ref 0–0.5)
IMM GRANULOCYTES NFR BLD AUTO: 1.8 % (ref 0–5)
INR PPP: 1
LYMPHOCYTES # BLD: 4.1 K/UL (ref 0.5–4.6)
LYMPHOCYTES NFR BLD: 30.1 % (ref 13–44)
MCH RBC QN AUTO: 30.4 PG (ref 26.1–32.9)
MCHC RBC AUTO-ENTMCNC: 33.1 G/DL (ref 31.4–35)
MCV RBC AUTO: 91.8 FL (ref 82–102)
MONOCYTES # BLD: 0.99 K/UL (ref 0.1–1.3)
MONOCYTES NFR BLD: 7.3 % (ref 4–12)
NEUTS SEG # BLD: 8.24 K/UL (ref 1.7–8.2)
NEUTS SEG NFR BLD: 60.3 % (ref 43–78)
NRBC # BLD: 0 K/UL (ref 0–0.2)
PLATELET # BLD AUTO: 415 K/UL (ref 150–450)
PMV BLD AUTO: 9.2 FL (ref 9.4–12.3)
POTASSIUM SERPL-SCNC: 3.3 MMOL/L (ref 3.5–5.1)
PROT SERPL-MCNC: 5.9 G/DL (ref 6.3–8.2)
PROTHROMBIN TIME: 13.6 SEC (ref 11.3–14.9)
RBC # BLD AUTO: 2.8 M/UL (ref 4.23–5.6)
SODIUM SERPL-SCNC: 139 MMOL/L (ref 136–145)
WBC # BLD AUTO: 13.6 K/UL (ref 4.3–11.1)

## 2025-01-23 PROCEDURE — 85610 PROTHROMBIN TIME: CPT

## 2025-01-23 PROCEDURE — 86923 COMPATIBILITY TEST ELECTRIC: CPT

## 2025-01-23 PROCEDURE — 2580000003 HC RX 258: Performed by: STUDENT IN AN ORGANIZED HEALTH CARE EDUCATION/TRAINING PROGRAM

## 2025-01-23 PROCEDURE — 2500000003 HC RX 250 WO HCPCS: Performed by: INTERNAL MEDICINE

## 2025-01-23 PROCEDURE — 85014 HEMATOCRIT: CPT

## 2025-01-23 PROCEDURE — 86850 RBC ANTIBODY SCREEN: CPT

## 2025-01-23 PROCEDURE — 6360000002 HC RX W HCPCS: Performed by: INTERNAL MEDICINE

## 2025-01-23 PROCEDURE — 2580000003 HC RX 258: Performed by: INTERNAL MEDICINE

## 2025-01-23 PROCEDURE — 85025 COMPLETE CBC W/AUTO DIFF WBC: CPT

## 2025-01-23 PROCEDURE — 96365 THER/PROPH/DIAG IV INF INIT: CPT

## 2025-01-23 PROCEDURE — 80053 COMPREHEN METABOLIC PANEL: CPT

## 2025-01-23 PROCEDURE — 85018 HEMOGLOBIN: CPT

## 2025-01-23 PROCEDURE — 96375 TX/PRO/DX INJ NEW DRUG ADDON: CPT

## 2025-01-23 PROCEDURE — 2580000003 HC RX 258

## 2025-01-23 PROCEDURE — 86901 BLOOD TYPING SEROLOGIC RH(D): CPT

## 2025-01-23 PROCEDURE — 96368 THER/DIAG CONCURRENT INF: CPT

## 2025-01-23 PROCEDURE — 2060000000 HC ICU INTERMEDIATE R&B

## 2025-01-23 PROCEDURE — 96366 THER/PROPH/DIAG IV INF ADDON: CPT

## 2025-01-23 PROCEDURE — 71045 X-RAY EXAM CHEST 1 VIEW: CPT

## 2025-01-23 PROCEDURE — P9016 RBC LEUKOCYTES REDUCED: HCPCS

## 2025-01-23 PROCEDURE — 99285 EMERGENCY DEPT VISIT HI MDM: CPT

## 2025-01-23 PROCEDURE — 6360000002 HC RX W HCPCS

## 2025-01-23 PROCEDURE — 6360000002 HC RX W HCPCS: Performed by: STUDENT IN AN ORGANIZED HEALTH CARE EDUCATION/TRAINING PROGRAM

## 2025-01-23 PROCEDURE — 86900 BLOOD TYPING SEROLOGIC ABO: CPT

## 2025-01-23 RX ORDER — SODIUM CHLORIDE 0.9 % (FLUSH) 0.9 %
5-40 SYRINGE (ML) INJECTION PRN
Status: DISCONTINUED | OUTPATIENT
Start: 2025-01-23 | End: 2025-01-25 | Stop reason: HOSPADM

## 2025-01-23 RX ORDER — SODIUM CHLORIDE 0.9 % (FLUSH) 0.9 %
5-40 SYRINGE (ML) INJECTION EVERY 12 HOURS SCHEDULED
Status: DISCONTINUED | OUTPATIENT
Start: 2025-01-23 | End: 2025-01-23 | Stop reason: SDUPTHER

## 2025-01-23 RX ORDER — POTASSIUM CHLORIDE 7.45 MG/ML
10 INJECTION INTRAVENOUS PRN
Status: DISCONTINUED | OUTPATIENT
Start: 2025-01-23 | End: 2025-01-25 | Stop reason: HOSPADM

## 2025-01-23 RX ORDER — ACETAMINOPHEN 650 MG/1
650 SUPPOSITORY RECTAL EVERY 6 HOURS PRN
Status: DISCONTINUED | OUTPATIENT
Start: 2025-01-23 | End: 2025-01-25 | Stop reason: HOSPADM

## 2025-01-23 RX ORDER — ONDANSETRON 4 MG/1
4 TABLET, ORALLY DISINTEGRATING ORAL EVERY 8 HOURS PRN
Status: DISCONTINUED | OUTPATIENT
Start: 2025-01-23 | End: 2025-01-25 | Stop reason: HOSPADM

## 2025-01-23 RX ORDER — SODIUM CHLORIDE 9 MG/ML
INJECTION, SOLUTION INTRAVENOUS CONTINUOUS
Status: DISPENSED | OUTPATIENT
Start: 2025-01-23 | End: 2025-01-24

## 2025-01-23 RX ORDER — SODIUM CHLORIDE 9 MG/ML
INJECTION, SOLUTION INTRAVENOUS CONTINUOUS
Status: DISCONTINUED | OUTPATIENT
Start: 2025-01-23 | End: 2025-01-23 | Stop reason: HOSPADM

## 2025-01-23 RX ORDER — OCTREOTIDE ACETATE 100 UG/ML
50 INJECTION, SOLUTION INTRAVENOUS; SUBCUTANEOUS ONCE
Status: COMPLETED | OUTPATIENT
Start: 2025-01-23 | End: 2025-01-23

## 2025-01-23 RX ORDER — ONDANSETRON 2 MG/ML
8 INJECTION INTRAMUSCULAR; INTRAVENOUS
Status: COMPLETED | OUTPATIENT
Start: 2025-01-23 | End: 2025-01-23

## 2025-01-23 RX ORDER — ONDANSETRON 2 MG/ML
4 INJECTION INTRAMUSCULAR; INTRAVENOUS EVERY 6 HOURS PRN
Status: DISCONTINUED | OUTPATIENT
Start: 2025-01-23 | End: 2025-01-25 | Stop reason: HOSPADM

## 2025-01-23 RX ORDER — POLYETHYLENE GLYCOL 3350 17 G/17G
17 POWDER, FOR SOLUTION ORAL DAILY PRN
Status: DISCONTINUED | OUTPATIENT
Start: 2025-01-23 | End: 2025-01-25 | Stop reason: HOSPADM

## 2025-01-23 RX ORDER — SODIUM CHLORIDE 9 MG/ML
INJECTION, SOLUTION INTRAVENOUS PRN
Status: DISCONTINUED | OUTPATIENT
Start: 2025-01-23 | End: 2025-01-23 | Stop reason: HOSPADM

## 2025-01-23 RX ORDER — MAGNESIUM SULFATE IN WATER 40 MG/ML
2000 INJECTION, SOLUTION INTRAVENOUS PRN
Status: DISCONTINUED | OUTPATIENT
Start: 2025-01-23 | End: 2025-01-25 | Stop reason: HOSPADM

## 2025-01-23 RX ORDER — POTASSIUM CHLORIDE 1500 MG/1
40 TABLET, EXTENDED RELEASE ORAL PRN
Status: DISCONTINUED | OUTPATIENT
Start: 2025-01-23 | End: 2025-01-25 | Stop reason: HOSPADM

## 2025-01-23 RX ORDER — ACETAMINOPHEN 325 MG/1
650 TABLET ORAL EVERY 6 HOURS PRN
Status: DISCONTINUED | OUTPATIENT
Start: 2025-01-23 | End: 2025-01-25 | Stop reason: HOSPADM

## 2025-01-23 RX ORDER — SODIUM CHLORIDE 9 MG/ML
INJECTION, SOLUTION INTRAVENOUS PRN
Status: DISCONTINUED | OUTPATIENT
Start: 2025-01-23 | End: 2025-01-25 | Stop reason: HOSPADM

## 2025-01-23 RX ORDER — SODIUM CHLORIDE 0.9 % (FLUSH) 0.9 %
5-40 SYRINGE (ML) INJECTION EVERY 12 HOURS SCHEDULED
Status: DISCONTINUED | OUTPATIENT
Start: 2025-01-23 | End: 2025-01-25 | Stop reason: HOSPADM

## 2025-01-23 RX ADMIN — OCTREOTIDE ACETATE 25 MCG/HR: 500 INJECTION, SOLUTION INTRAVENOUS; SUBCUTANEOUS at 22:04

## 2025-01-23 RX ADMIN — PANTOPRAZOLE SODIUM 80 MG: 40 INJECTION, POWDER, FOR SOLUTION INTRAVENOUS at 13:48

## 2025-01-23 RX ADMIN — ONDANSETRON 8 MG: 2 INJECTION, SOLUTION INTRAMUSCULAR; INTRAVENOUS at 13:48

## 2025-01-23 RX ADMIN — OCTREOTIDE ACETATE 50 MCG/HR: 500 INJECTION, SOLUTION INTRAVENOUS; SUBCUTANEOUS at 14:51

## 2025-01-23 RX ADMIN — OCTREOTIDE ACETATE 50 MCG: 100 INJECTION, SOLUTION INTRAVENOUS; SUBCUTANEOUS at 14:46

## 2025-01-23 RX ADMIN — SODIUM CHLORIDE, PRESERVATIVE FREE 10 ML: 5 INJECTION INTRAVENOUS at 22:15

## 2025-01-23 RX ADMIN — CEFTRIAXONE SODIUM 2000 MG: 2 INJECTION, POWDER, FOR SOLUTION INTRAMUSCULAR; INTRAVENOUS at 15:29

## 2025-01-23 RX ADMIN — SODIUM CHLORIDE: 9 INJECTION, SOLUTION INTRAVENOUS at 14:55

## 2025-01-23 RX ADMIN — SODIUM CHLORIDE: 9 INJECTION, SOLUTION INTRAVENOUS at 22:09

## 2025-01-23 ASSESSMENT — PAIN - FUNCTIONAL ASSESSMENT
PAIN_FUNCTIONAL_ASSESSMENT: NONE - DENIES PAIN
PAIN_FUNCTIONAL_ASSESSMENT: NONE - DENIES PAIN

## 2025-01-23 NOTE — ED TRIAGE NOTES
Patient presents with friend who notes pt had episode of LOC last night.  Pt went to work today and began to feel unwell. Pt was in the process of driving himself to ED when he pulled over bc he was no longer able to drive safely.  Pt presented to ED with friend who notes pt began throwing up large blood clots about 20 minutes PTA.  PT is pale in triage. Pt denies ETOH use.

## 2025-01-23 NOTE — H&P
Hospitalist History and Physical   Admit Date:  No admission date for patient encounter.   Name:  Bulmaro Borja   Age:  44 y.o.  Sex:  male  :  1980   MRN:  [id number]   Room:  Room/bed info not found    Presenting/Chief Complaint: No chief complaint on file.     Reason(s) for Admission: GI bleed [K92.2]     History of Present Illness:   Bulmaro Borja is a 44 y.o. male with medical history of GI bleed in the past about 2 years ago and had EGD and colonoscopy at that time which were both normal.  Patient did take Spirulina supplement prior to his episode last time.  Patient states he was taking the same supplement this time and noted black stools for the past couple days.  Patient does state he takes ibuprofen but maybe once per week and nothing more.  In regards to alcohol use patient denies any current alcohol use and states he maybe has 10 beers a year.  Patient was seen in emergency department and was having ongoing emesis which was coffee ground.  Patient received 1 unit PRBC in the emergency department and was placed on Protonix and octreotide drip which will be continued.  Patient still having slight nausea on exam but denied any other acute complaints such as abdominal pain or shortness of breath.  Discussion had using  iPad      Assessment & Plan:   Hematemesis  Acute blood loss anemia  Patient was taking spurulina supplement and this also occurred after patient was taking this 2 years ago as well   Of note supplement patient was taking does appear to have increased risk of bruising and bleeding given anticoagulant factor.  Medication inhibits fibrin  Baseline hemoglobin appears to be around 16  Hemoglobin 8.5  Patient received 1 unit PRBC in the emergency department  Patient received Protonix and octreotide  Given upper GI bleed and fatty liver we will cover with ceftriaxone.  Will check ultrasound abdomen to assess liver morphology  Continue every 4

## 2025-01-23 NOTE — ED PROVIDER NOTES
Emergency Department Provider Note       PCP: Armando Davey, APRN - NP   Age: 44 y.o.   Sex: male     DISPOSITION Decision To Transfer 01/23/2025 03:07:00 PM    ICD-10-CM    1. Hematemesis with nausea  K92.0           Medical Decision Making     44-year-old male with history of prior GI bleed presenting this department with bright red hematemesis.  Orders placed for blood work, type and screen PT/INR.  Will move forward with fluid bolus, Protonix and octreotide.  I have typed and screened patient for blood transfusion we will move forward with 1 unit of blood at this time given active hemorrhage.    Discussed patient case with Dr. Short of GI, request transfer to downtow ICU.  Hospitalist in this facility aware and will evaluate patient to facilitate transport.    Patient aware of plan of care     1 or more acute illnesses that pose a threat to life or bodily function.   Drug therapy given requiring intensive monitoring for toxicity.  Discussion with external consultants.  Chronic medical problems impacting care include hx of GI bleed, NSAID use.  Shared medical decision making was utilized in creating the patients health plan today.  I independently ordered and reviewed each unique test.    I reviewed external records: ED visit note from a different ED.   I reviewed external records: provider visit note from PCP.  I reviewed external records: provider visit note from outside specialist.  I reviewed external records: previous lab results from outside ED.   The patients assessment required an independent historian: Friend.  The reason they were needed is important historical information not provided by the patient.  ED cardiac monitoring rhythm strip was ordered and interpreted:  sinus rhythm, no evidence of an arrhythmia  ST Segments:Normal ST segments - NO STEMI   Rate: 74  I interpreted the X-rays No infiltrate.    The patient was admitted and I have discussed patient management with the admitting

## 2025-01-23 NOTE — PROGRESS NOTES
Patient/caregivers speak [place] citizen  as their preferred language for their healthcare communication. For safe communication, use the AMN  carts or call:    Senior  Navigator Jess Berumen at [phone] or   AMN phone services for [facility] at [phone]    General phone: 378-[facility]3 ( [phone])  Email: [email or web address]    Always document the use of interpreting services ('s ID number) in your clinical notes.    Our interpreters are available for team members working with limited  English proficient (LEP) patients remotely, via phone or video or in person (if needed for special cases).    When using family members to interpret, for the safety of the patient and protection of the communication of both our patient and [facility] staff the VRI or telephonic  should remain on the line to monitor that all communication is accurate and complete. The  should be instructed to notify [facility] staff immediately if there are any inaccuracies.         Thank you,        Jess WOMACK  Senior /Navigator

## 2025-01-23 NOTE — CONSENT
Informed Consent for Blood Component Transfusion Note    I have discussed with the patient the rationale for blood component transfusion; its benefits in treating or preventing fatigue, organ damage, or death; and its risk which includes mild transfusion reactions, rare risk of blood borne infection, or more serious but rare reactions. I have discussed the alternatives to transfusion, including the risk and consequences of not receiving transfusion. The patient had an opportunity to ask questions and had agreed to proceed with transfusion of blood components.    Electronically signed by Moody Daniels DO on 1/23/25 at 2:07 PM EST

## 2025-01-24 ENCOUNTER — ANESTHESIA EVENT (OUTPATIENT)
Dept: ENDOSCOPY | Age: 45
End: 2025-01-24

## 2025-01-24 ENCOUNTER — ANESTHESIA (OUTPATIENT)
Dept: ENDOSCOPY | Age: 45
End: 2025-01-24

## 2025-01-24 ENCOUNTER — APPOINTMENT (OUTPATIENT)
Dept: ULTRASOUND IMAGING | Age: 45
End: 2025-01-24
Attending: INTERNAL MEDICINE

## 2025-01-24 PROBLEM — K92.0 HEMATEMESIS: Status: ACTIVE | Noted: 2025-01-23

## 2025-01-24 PROBLEM — D64.9 ANEMIA: Status: ACTIVE | Noted: 2025-01-23

## 2025-01-24 LAB
ABO + RH BLD: NORMAL
ALBUMIN SERPL-MCNC: 3.1 G/DL (ref 3.5–5)
ALBUMIN/GLOB SERPL: 1.3 (ref 1–1.9)
ALP SERPL-CCNC: 59 U/L (ref 40–129)
ALT SERPL-CCNC: 20 U/L (ref 8–55)
ANION GAP SERPL CALC-SCNC: 8 MMOL/L (ref 7–16)
AST SERPL-CCNC: 20 U/L (ref 15–37)
BASOPHILS # BLD: 0.03 K/UL (ref 0–0.2)
BASOPHILS NFR BLD: 0.2 % (ref 0–2)
BILIRUB SERPL-MCNC: 0.6 MG/DL (ref 0–1.2)
BLD PROD TYP BPU: NORMAL
BLOOD BANK BLOOD PRODUCT EXPIRATION DATE: NORMAL
BLOOD BANK DISPENSE STATUS: NORMAL
BLOOD BANK ISBT PRODUCT BLOOD TYPE: 6200
BLOOD BANK PRODUCT CODE: NORMAL
BLOOD BANK UNIT TYPE AND RH: NORMAL
BLOOD GROUP ANTIBODIES SERPL: NORMAL
BPU ID: NORMAL
BUN SERPL-MCNC: 18 MG/DL (ref 6–23)
CALCIUM SERPL-MCNC: 8.2 MG/DL (ref 8.8–10.2)
CHLORIDE SERPL-SCNC: 109 MMOL/L (ref 98–107)
CO2 SERPL-SCNC: 25 MMOL/L (ref 20–29)
CREAT SERPL-MCNC: 0.95 MG/DL (ref 0.8–1.3)
CROSSMATCH RESULT: NORMAL
DIFFERENTIAL METHOD BLD: ABNORMAL
EOSINOPHIL # BLD: 0.02 K/UL (ref 0–0.8)
EOSINOPHIL NFR BLD: 0.2 % (ref 0.5–7.8)
ERYTHROCYTE [DISTWIDTH] IN BLOOD BY AUTOMATED COUNT: 14.9 % (ref 11.9–14.6)
GLOBULIN SER CALC-MCNC: 2.3 G/DL (ref 2.3–3.5)
GLUCOSE SERPL-MCNC: 125 MG/DL (ref 70–99)
HCT VFR BLD AUTO: 24.3 % (ref 41.1–50.3)
HCT VFR BLD AUTO: 24.6 % (ref 41.1–50.3)
HCT VFR BLD AUTO: 26 % (ref 41.1–50.3)
HCT VFR BLD AUTO: 27 % (ref 41.1–50.3)
HGB BLD-MCNC: 8 G/DL (ref 13.6–17.2)
HGB BLD-MCNC: 8.4 G/DL (ref 13.6–17.2)
HGB BLD-MCNC: 9.1 G/DL (ref 13.6–17.2)
HGB BLD-MCNC: 9.1 G/DL (ref 13.6–17.2)
IMM GRANULOCYTES # BLD AUTO: 0.15 K/UL (ref 0–0.5)
IMM GRANULOCYTES NFR BLD AUTO: 1.2 % (ref 0–5)
LYMPHOCYTES # BLD: 1.89 K/UL (ref 0.5–4.6)
LYMPHOCYTES NFR BLD: 15 % (ref 13–44)
MCH RBC QN AUTO: 30.7 PG (ref 26.1–32.9)
MCHC RBC AUTO-ENTMCNC: 34.1 G/DL (ref 31.4–35)
MCV RBC AUTO: 89.8 FL (ref 82–102)
MONOCYTES # BLD: 0.86 K/UL (ref 0.1–1.3)
MONOCYTES NFR BLD: 6.8 % (ref 4–12)
NEUTS SEG # BLD: 9.68 K/UL (ref 1.7–8.2)
NEUTS SEG NFR BLD: 76.6 % (ref 43–78)
NRBC # BLD: 0 K/UL (ref 0–0.2)
PLATELET # BLD AUTO: 257 K/UL (ref 150–450)
PMV BLD AUTO: 9.2 FL (ref 9.4–12.3)
POTASSIUM SERPL-SCNC: 4 MMOL/L (ref 3.5–5.1)
PROT SERPL-MCNC: 5.4 G/DL (ref 6.3–8.2)
RBC # BLD AUTO: 2.74 M/UL (ref 4.23–5.6)
SODIUM SERPL-SCNC: 141 MMOL/L (ref 136–145)
SPECIMEN EXP DATE BLD: NORMAL
UNIT DIVISION: 0
UNIT ISSUE DATE/TIME: NORMAL
WBC # BLD AUTO: 12.6 K/UL (ref 4.3–11.1)

## 2025-01-24 PROCEDURE — 2500000003 HC RX 250 WO HCPCS: Performed by: INTERNAL MEDICINE

## 2025-01-24 PROCEDURE — 85018 HEMOGLOBIN: CPT

## 2025-01-24 PROCEDURE — 2580000003 HC RX 258

## 2025-01-24 PROCEDURE — 2060000000 HC ICU INTERMEDIATE R&B

## 2025-01-24 PROCEDURE — 6360000002 HC RX W HCPCS: Performed by: NURSE ANESTHETIST, CERTIFIED REGISTERED

## 2025-01-24 PROCEDURE — 43235 EGD DIAGNOSTIC BRUSH WASH: CPT | Performed by: INTERNAL MEDICINE

## 2025-01-24 PROCEDURE — 6360000002 HC RX W HCPCS: Performed by: NURSE PRACTITIONER

## 2025-01-24 PROCEDURE — 2580000003 HC RX 258: Performed by: INTERNAL MEDICINE

## 2025-01-24 PROCEDURE — 85025 COMPLETE CBC W/AUTO DIFF WBC: CPT

## 2025-01-24 PROCEDURE — 7100000001 HC PACU RECOVERY - ADDTL 15 MIN: Performed by: INTERNAL MEDICINE

## 2025-01-24 PROCEDURE — 85014 HEMATOCRIT: CPT

## 2025-01-24 PROCEDURE — 3609017100 HC EGD: Performed by: INTERNAL MEDICINE

## 2025-01-24 PROCEDURE — 3700000001 HC ADD 15 MINUTES (ANESTHESIA): Performed by: INTERNAL MEDICINE

## 2025-01-24 PROCEDURE — 36415 COLL VENOUS BLD VENIPUNCTURE: CPT

## 2025-01-24 PROCEDURE — 7100000000 HC PACU RECOVERY - FIRST 15 MIN: Performed by: INTERNAL MEDICINE

## 2025-01-24 PROCEDURE — 3700000000 HC ANESTHESIA ATTENDED CARE: Performed by: INTERNAL MEDICINE

## 2025-01-24 PROCEDURE — 6360000002 HC RX W HCPCS

## 2025-01-24 PROCEDURE — 99254 IP/OBS CNSLTJ NEW/EST MOD 60: CPT | Performed by: INTERNAL MEDICINE

## 2025-01-24 PROCEDURE — 80053 COMPREHEN METABOLIC PANEL: CPT

## 2025-01-24 PROCEDURE — 2709999900 HC NON-CHARGEABLE SUPPLY: Performed by: INTERNAL MEDICINE

## 2025-01-24 PROCEDURE — 76705 ECHO EXAM OF ABDOMEN: CPT

## 2025-01-24 PROCEDURE — 2580000003 HC RX 258: Performed by: NURSE PRACTITIONER

## 2025-01-24 PROCEDURE — 0DJ08ZZ INSPECTION OF UPPER INTESTINAL TRACT, VIA NATURAL OR ARTIFICIAL OPENING ENDOSCOPIC: ICD-10-PCS | Performed by: INTERNAL MEDICINE

## 2025-01-24 PROCEDURE — 6360000002 HC RX W HCPCS: Performed by: INTERNAL MEDICINE

## 2025-01-24 RX ORDER — SUCCINYLCHOLINE CHLORIDE 20 MG/ML
INJECTION INTRAMUSCULAR; INTRAVENOUS
Status: DISCONTINUED | OUTPATIENT
Start: 2025-01-24 | End: 2025-01-24 | Stop reason: SDUPTHER

## 2025-01-24 RX ORDER — PROPOFOL 10 MG/ML
INJECTION, EMULSION INTRAVENOUS
Status: DISCONTINUED | OUTPATIENT
Start: 2025-01-24 | End: 2025-01-24 | Stop reason: SDUPTHER

## 2025-01-24 RX ORDER — ONDANSETRON 2 MG/ML
INJECTION INTRAMUSCULAR; INTRAVENOUS
Status: DISCONTINUED | OUTPATIENT
Start: 2025-01-24 | End: 2025-01-24 | Stop reason: SDUPTHER

## 2025-01-24 RX ORDER — NALOXONE HYDROCHLORIDE 0.4 MG/ML
INJECTION, SOLUTION INTRAMUSCULAR; INTRAVENOUS; SUBCUTANEOUS PRN
Status: DISCONTINUED | OUTPATIENT
Start: 2025-01-24 | End: 2025-01-24 | Stop reason: HOSPADM

## 2025-01-24 RX ORDER — LIDOCAINE HYDROCHLORIDE 20 MG/ML
INJECTION, SOLUTION EPIDURAL; INFILTRATION; INTRACAUDAL; PERINEURAL
Status: DISCONTINUED | OUTPATIENT
Start: 2025-01-24 | End: 2025-01-24 | Stop reason: SDUPTHER

## 2025-01-24 RX ORDER — PHENYLEPHRINE HYDROCHLORIDE 10 MG/ML
INJECTION INTRAVENOUS
Status: DISCONTINUED | OUTPATIENT
Start: 2025-01-24 | End: 2025-01-24 | Stop reason: SDUPTHER

## 2025-01-24 RX ADMIN — PHENYLEPHRINE HYDROCHLORIDE 100 MCG: 10 INJECTION INTRAVENOUS at 15:23

## 2025-01-24 RX ADMIN — ONDANSETRON 4 MG: 2 INJECTION INTRAMUSCULAR; INTRAVENOUS at 15:26

## 2025-01-24 RX ADMIN — WATER 1000 MG: 1 INJECTION INTRAMUSCULAR; INTRAVENOUS; SUBCUTANEOUS at 17:14

## 2025-01-24 RX ADMIN — LIDOCAINE HYDROCHLORIDE 60 MG: 20 INJECTION, SOLUTION EPIDURAL; INFILTRATION; INTRACAUDAL; PERINEURAL at 15:12

## 2025-01-24 RX ADMIN — SODIUM CHLORIDE, PRESERVATIVE FREE 10 ML: 5 INJECTION INTRAVENOUS at 09:58

## 2025-01-24 RX ADMIN — SODIUM CHLORIDE: 9 INJECTION, SOLUTION INTRAVENOUS at 06:26

## 2025-01-24 RX ADMIN — Medication 150 MG: at 15:12

## 2025-01-24 RX ADMIN — PROPOFOL 170 MG: 10 INJECTION, EMULSION INTRAVENOUS at 15:12

## 2025-01-24 RX ADMIN — SODIUM CHLORIDE, PRESERVATIVE FREE 10 ML: 5 INJECTION INTRAVENOUS at 20:49

## 2025-01-24 RX ADMIN — SODIUM CHLORIDE 40 MG: 9 INJECTION INTRAMUSCULAR; INTRAVENOUS; SUBCUTANEOUS at 17:14

## 2025-01-24 RX ADMIN — OCTREOTIDE ACETATE 25 MCG/HR: 500 INJECTION, SOLUTION INTRAVENOUS; SUBCUTANEOUS at 19:27

## 2025-01-24 RX ADMIN — PHENYLEPHRINE HYDROCHLORIDE 200 MCG: 10 INJECTION INTRAVENOUS at 15:24

## 2025-01-24 ASSESSMENT — ENCOUNTER SYMPTOMS
ABDOMINAL PAIN: 0
VOMITING: 0
NAUSEA: 1
BLOOD IN STOOL: 0

## 2025-01-24 ASSESSMENT — PAIN - FUNCTIONAL ASSESSMENT: PAIN_FUNCTIONAL_ASSESSMENT: 0-10

## 2025-01-24 NOTE — CONSULTS
Consult Note            Date:1/24/2025        Patient Name:Bulmaro Borja     YOB: 1980     Age:44 y.o.    Inpatient consult to GI  Consult performed by: Ignacia Almanzar APRN - CNP  Consult ordered by: Ajay Leon MD          Chief Complaint   No chief complaint on file.       History Obtained From   patient,  Frandy session #[id number]     History of Present Illness   Bulmaro Borja is a 45 yo male who presented to the ED with melena. PMH includes GI bleed 2 years ago. GI was consulted for likely upper GI bleed.  Frandy was used due to patient being Sami speaking. Patient states he took Spirulina to help clean out his stomach and began experiencing black stools with black vomit. This has been going on for about 4-5 days now. He did have some emesis last night with blood in it. He currently states he is only nauseas because he had to ride the elevator earlier. He denies abdominal pain or diarrhea. His last BM was 3 days ago. He denies any blood thinner use. He does occasionally use NSAIDs. He denies alcohol or tobacco use. He took this medication 2 years ago and began experiencing the same symptoms. He had an EGD and colonoscopy at the time that was normal. Hgb when he arrived was 8.5 and is currently 8.4. Last year his Hgb was around 16. Abdominal ultrasound is pending.     Labs: Hgb 8.4 (9.4), Hct 24.6 (27.7), BUN 18, Creatinine 0.95    Medications: Ibuprofen, rocephin, octreotide    Imaging: Abdominal ultrasound pending    Past Medical History     Past Medical History:   Diagnosis Date    Kidney stone         Past Surgical History     Past Surgical History:   Procedure Laterality Date    COLONOSCOPY N/A 2/16/2022    COLONOSCOPY/BMI 27 Room 339/Armenian performed by Davonte Jaime MD at [facility]OSCOPY    LITHOTRIPSY          Medications     Prior to Admission medications    Medication Sig Start Date End Date Taking? Authorizing Provider

## 2025-01-24 NOTE — PERIOP NOTE
TRANSFER - OUT REPORT:    Verbal report given to USHA Nolasco on Bulmaro Borja  being transferred to [facility] for routine progression of patient care       Report consisted of patient’s Situation, Background, Assessment and   Recommendations(SBAR).     Information from the following report(s) Nurse Handoff Report, Adult Overview, Surgery Report, and Neuro Assessment was reviewed with the receiving nurse.    Lines:   Peripheral IV 01/23/25 Left Antecubital (Active)   Site Assessment Clean, dry & intact 01/24/25 0800   Line Status Blood return noted 01/24/25 0800   Line Care Cap changed 01/24/25 0800   Phlebitis Assessment No symptoms 01/24/25 0800   Infiltration Assessment 0 01/24/25 0800   Alcohol Cap Used Yes 01/24/25 0800   Dressing Status Clean, dry & intact 01/24/25 0800   Dressing Type Transparent 01/24/25 0800   Dressing Intervention Dressing changed 01/23/25 2119       Peripheral IV 01/24/25 Posterior;Right Hand (Active)   Site Assessment Clean, dry & intact 01/24/25 0800   Line Status Blood return noted 01/24/25 0800   Line Care Cap changed 01/24/25 0800   Phlebitis Assessment No symptoms 01/24/25 0800   Infiltration Assessment 0 01/24/25 0800   Alcohol Cap Used Yes 01/24/25 0800   Dressing Status Clean, dry & intact 01/24/25 0800   Dressing Type Transparent 01/24/25 0800        Opportunity for questions and clarification was provided.      Patient to be transported with:   O2 @ 3 liters  Registered Nurse

## 2025-01-24 NOTE — ANESTHESIA PRE PROCEDURE
Department of Anesthesiology  Preprocedure Note       Name:  Bulmaro Borja   Age:  44 y.o.  :  1980                                          MRN:  [id number]         Date:  2025      Surgeon: Surgeon(s):  Katherine Short MD    Procedure: Procedure(s):  ESOPHAGOGASTRODUODENOSCOPY    Medications prior to admission:   Prior to Admission medications    Medication Sig Start Date End Date Taking? Authorizing Provider   ibuprofen (IBU) 800 MG tablet Take 1 tablet by mouth every 8 hours as needed for Pain 3/21/24   Javier Egan MD   ondansetron (ZOFRAN-ODT) 4 MG disintegrating tablet Take 4 mg by mouth every 8 hours as needed  Patient not taking: Reported on 3/21/2024 2/12/22   Automatic Reconciliation, Ar       Current medications:    Current Facility-Administered Medications   Medication Dose Route Frequency Provider Last Rate Last Admin    sodium chloride flush 0.9 % injection 5-40 mL  5-40 mL IntraVENous PRN Ajay Leon MD        0.9 % sodium chloride infusion   IntraVENous PRN Ajay Leon MD        potassium chloride (KLOR-CON M) extended release tablet 40 mEq  40 mEq Oral PRN Ajay Leon MD        Or    potassium bicarb-citric acid (EFFER-K) effervescent tablet 40 mEq  40 mEq Oral PRN Ajay Leon MD        Or    potassium chloride 10 mEq/100 mL IVPB (Peripheral Line)  10 mEq IntraVENous PRN Ajay Leon MD        magnesium sulfate 2000 mg in 50 mL IVPB premix  2,000 mg IntraVENous PRN Ajay Leon MD        ondansetron (ZOFRAN-ODT) disintegrating tablet 4 mg  4 mg Oral Q8H PRN Ajay Leon MD        Or    ondansetron (ZOFRAN) injection 4 mg  4 mg IntraVENous Q6H PRN Ajay Leon MD        polyethylene glycol (GLYCOLAX) packet 17 g  17 g Oral Daily PRN Ajay Leon MD        acetaminophen (TYLENOL) tablet 650 mg  650 mg Oral Q6H PRN Ajay Leon

## 2025-01-24 NOTE — PROGRESS NOTES
TRANSFER - IN REPORT:    Verbal report received from Laura KERR on Bulmaro Borja  being received from  ER for routine care.    Report consisted of patient's Situation, Background, Assessment and   Recommendations(SBAR).     Information from the following report(s) Nurse Handoff Report was reviewed with the receiving nurse.    Opportunity for questions and clarification was provided.      Assessment completed upon patient's arrival to unit and care assumed.

## 2025-01-24 NOTE — ED NOTES
TRANSFER - OUT REPORT:    Verbal report given to USHA Horn on Bulmaro Borja  being transferred to [place] [id number] for routine progression of patient care       Report consisted of patient's Situation, Background, Assessment and   Recommendations(SBAR).     Information from the following report(s) ED SBAR was reviewed with the receiving nurse.    [place] Fall Assessment:    Presents to emergency department  because of falls (Syncope, seizure, or loss of consciousness): Yes  Age > 70: No  Altered Mental Status, Intoxication with alcohol or substance confusion (Disorientation, impaired judgment, poor safety awaremess, or inability to follow instructions): No  Impaired Mobility: Ambulates or transfers with assistive devices or assistance; Unable to ambulate or transer.: Yes  Nursing Judgement: Yes          Lines:   Peripheral IV 01/23/25 Right Antecubital (Active)   Site Assessment Clean, dry & intact 01/23/25 1342       Peripheral IV 01/23/25 Left Antecubital (Active)   Site Assessment Clean, dry & intact 01/23/25 1446   Line Status Blood return noted 01/23/25 1446   Phlebitis Assessment No symptoms 01/23/25 1446   Infiltration Assessment 0 01/23/25 1446        Opportunity for questions and clarification was provided.      Patient transported with:  Registered Nurse

## 2025-01-24 NOTE — PROGRESS NOTES
Hospitalist Progress Note   Admit Date:  2025  9:05 PM   Name:  Bulmaro Borja   Age:  44 y.o.  Sex:  male  :  1980   MRN:  [id number]   Room:  [facility]    Presenting/Chief Complaint: No chief complaint on file.     Reason(s) for Admission: GI bleed [K92.2]     Hospital Course:   Bulmaro Borja is a 44 y.o. male with medical history of Kidney stone and GI bleed- last episode about 2 years ago and underwent an EGD and colonoscopy which were both normal.   who presented with black stool for couple days. Of report/note patient stated that he took a Spirulina supplement and also takes Ibuprofen about once a week. Reported taking about 10 beers a year. In the ER, it was noted/reported that patient has committing with coffee ground emesis. Initial labs showed hypokalemia, hgb level 8.5. CXR showed no acute abnormality. S/p 1 unit PRBC in the emergency room  and was started on Protonix and Octreotide drip. Patient admitted for further management and evaluation. GI consulted.        Subjective & 24hr Events:   - Patient seen this am. No complaints or acute distress noted. No c/o abdomial pain, nausea, or vomiting. No hematemesis or hematochezia. Patient NPO. Continued on Rocephin ans Octreotide drip. Abd US pending. EGD pending    Patient is Malay speaking but also speaks english and I was able to have a conversation with him in english     Assessment & Plan:     Principal Problem:    GI bleed    Acute blood loss anemia  -GI consult  -S/p 1 unit PRBC in the emergency department   -Abd US pending  -Monitor H/H-Q4hr  -Keep hgb greater than 7 and transfuse as per policy   -NPO  -On Octreotide drip  -On Rocephin   -IV fluids   -EGD pending    Leukocytosis  -Likely reactive from hematemesis  -Trend and  monitor     Hypokalemia  -Potassium replacement prn     Anticipated Discharge Arrangements:   Home    PT/OT evals ordered?  Not ordered; patient not expected to need     Alk Phosphatase 59 40 - 129 U/L    Total Protein 5.4 (L) 6.3 - 8.2 g/dL    Albumin 3.1 (L) 3.5 - 5.0 g/dL    Globulin 2.3 2.3 - 3.5 g/dL    Albumin/Globulin Ratio 1.3 1.0 - 1.9         No results for input(s): \"COVID19\" in the last 72 hours.    Current Meds:  Current Facility-Administered Medications   Medication Dose Route Frequency    sodium chloride flush 0.9 % injection 5-40 mL  5-40 mL IntraVENous PRN    0.9 % sodium chloride infusion   IntraVENous PRN    potassium chloride (KLOR-CON M) extended release tablet 40 mEq  40 mEq Oral PRN    Or    potassium bicarb-citric acid (EFFER-K) effervescent tablet 40 mEq  40 mEq Oral PRN    Or    potassium chloride 10 mEq/100 mL IVPB (Peripheral Line)  10 mEq IntraVENous PRN    magnesium sulfate 2000 mg in 50 mL IVPB premix  2,000 mg IntraVENous PRN    ondansetron (ZOFRAN-ODT) disintegrating tablet 4 mg  4 mg Oral Q8H PRN    Or    ondansetron (ZOFRAN) injection 4 mg  4 mg IntraVENous Q6H PRN    polyethylene glycol (GLYCOLAX) packet 17 g  17 g Oral Daily PRN    acetaminophen (TYLENOL) tablet 650 mg  650 mg Oral Q6H PRN    Or    acetaminophen (TYLENOL) suppository 650 mg  650 mg Rectal Q6H PRN    0.9 % sodium chloride infusion   IntraVENous Continuous    cefTRIAXone (ROCEPHIN) 1,000 mg in sterile water 10 mL IV syringe  1,000 mg IntraVENous Q24H    sodium chloride flush 0.9 % injection 5-40 mL  5-40 mL IntraVENous 2 times per day    octreotide (SANDOSTATIN) 500 mcg in sodium chloride 0.9 % 100 mL infusion ([id number])  25 mcg/hr IntraVENous Continuous       Signed:  DAMARI Avalos NP    Part of this note may have been written by using a voice dictation software.  The note has been proof read but may still contain some grammatical/other typographical errors.

## 2025-01-24 NOTE — CARE COORDINATION
MSN, CM:  spoke with patient this afternoon about discharge planning.  Patient lives with his son in own apartment.  Patient is independent with all ADL's and requires no equipment for ambulation. Patient denies any HH, rehab, palliative care, or home oxygen currently.  Patient works full time but is uninsured.  Patient was admitted for coffee ground emesis and dark stool.  Hgb was 8.5 with PRBC given.  GI consulted for EGD today.  Patient has no discharged needs at this time.  Case Management will continue to follow for any discharge needs.       01/24/25 1423   Service Assessment   Patient Orientation Alert and Oriented   Cognition Alert   History Provided By Patient   Primary Caregiver Self   Support Systems Children   Patient's Healthcare Decision Maker is: Patient Declined (Legal Next of Kin Remains as Decision Maker)   PCP Verified by CM No   Prior Functional Level Independent in ADLs/IADLs   Current Functional Level Independent in ADLs/IADLs   Can patient return to prior living arrangement Yes   Ability to make needs known: Good   Family able to assist with home care needs: Yes   Would you like for me to discuss the discharge plan with any other family members/significant others, and if so, who? No   Financial Resources None   Community Resources None   CM/SW Referral Other (see comment)   Social/Functional History   Lives With Son   Type of Home House   Home Layout One level   Home Access Level entry   Bathroom Shower/Tub Tub/Shower unit   Bathroom Toilet Standard   Bathroom Equipment None   Bathroom Accessibility Accessible   Home Equipment None   Receives Help From Family   Prior Level of Assist for ADLs Independent   Prior Level of Assist for Homemaking Independent   Homemaking Responsibilities Yes   Ambulation Assistance Independent   Prior Level of Assist for Transfers Independent   Active  No   Patient's  Info friends   Occupation Full time employment   Discharge Planning   Type of

## 2025-01-24 NOTE — ANESTHESIA POSTPROCEDURE EVALUATION
Department of Anesthesiology  Postprocedure Note    Patient: Bulmaro Borja  MRN: [id number]  YOB: 1980  Date of evaluation: 1/24/2025    Procedure Summary       Date: 01/24/25 Room / Location: [facility] ENDO FLOURO 1 / [facility] ENDOSCOPY    Anesthesia Start: 1508 Anesthesia Stop: 1538    Procedure: ESOPHAGOGASTRODUODENOSCOPY (Upper GI Region) Diagnosis:       Anemia      Hematemesis      (Anemia [D64.9])      (Hematemesis [K92.0])    Surgeons: Katherine Short MD Responsible Provider: Hannah Trotter MD    Anesthesia Type: general ASA Status: 2            Anesthesia Type: No value filed.    Jo Phase I: Jo Score: 9    Jo Phase II:      Anesthesia Post Evaluation    Patient location during evaluation: PACU  Patient participation: complete - patient participated  Level of consciousness: awake and alert  Airway patency: patent  Nausea & Vomiting: no nausea and no vomiting  Cardiovascular status: hemodynamically stable  Respiratory status: acceptable, nonlabored ventilation and spontaneous ventilation  Hydration status: euvolemic  Comments: /71   Pulse 59   Temp 98 °F (36.7 °C) (Infrared)   Resp 16   Ht 1.651 m (5' 5\")   Wt 85.7 kg (189 lb)   SpO2 100%   BMI 31.45 kg/m²     Multimodal analgesia pain management approach  Pain management: adequate and satisfactory to patient    No notable events documented.

## 2025-01-24 NOTE — PROGRESS NOTES
Patient/caregivers speak Chilean  as their preferred language for their healthcare communication. For safe communication, use the AMN  carts or call:    Senior  Navigator Jess Berumen at [phone] or   AMN phone services for [facility] at [phone]    General phone: 987-[facility]9 ( [phone])  Email: [email or web address]    Always document the use of interpreting services ('s ID number) in your clinical notes.    Our interpreters are available for team members working with limited  English proficient (LEP) patients remotely, via phone or video or in person (if needed for special cases).    When using family members to interpret, for the safety of the patient and protection of the communication of both our patient and [facility] staff the VRI or telephonic  should remain on the line to monitor that all communication is accurate and complete. The  should be instructed to notify [facility] staff immediately if there are any inaccuracies.         Thank you,        Jess WOMACK  Senior /Navigator

## 2025-01-25 VITALS
HEIGHT: 65 IN | TEMPERATURE: 97.3 F | RESPIRATION RATE: 18 BRPM | SYSTOLIC BLOOD PRESSURE: 119 MMHG | OXYGEN SATURATION: 98 % | WEIGHT: 189 LBS | DIASTOLIC BLOOD PRESSURE: 75 MMHG | HEART RATE: 61 BPM | BODY MASS INDEX: 31.49 KG/M2

## 2025-01-25 LAB
ANION GAP SERPL CALC-SCNC: 8 MMOL/L (ref 7–16)
BASOPHILS # BLD: 0.04 K/UL (ref 0–0.2)
BASOPHILS NFR BLD: 0.5 % (ref 0–2)
BUN SERPL-MCNC: 14 MG/DL (ref 6–23)
CALCIUM SERPL-MCNC: 8 MG/DL (ref 8.8–10.2)
CHLORIDE SERPL-SCNC: 107 MMOL/L (ref 98–107)
CO2 SERPL-SCNC: 25 MMOL/L (ref 20–29)
CREAT SERPL-MCNC: 0.89 MG/DL (ref 0.8–1.3)
DIFFERENTIAL METHOD BLD: ABNORMAL
EOSINOPHIL # BLD: 0.06 K/UL (ref 0–0.8)
EOSINOPHIL NFR BLD: 0.8 % (ref 0.5–7.8)
ERYTHROCYTE [DISTWIDTH] IN BLOOD BY AUTOMATED COUNT: 15 % (ref 11.9–14.6)
GLUCOSE SERPL-MCNC: 130 MG/DL (ref 70–99)
HCT VFR BLD AUTO: 24.3 % (ref 41.1–50.3)
HCT VFR BLD AUTO: 25.9 % (ref 41.1–50.3)
HCT VFR BLD AUTO: 25.9 % (ref 41.1–50.3)
HGB BLD-MCNC: 8 G/DL (ref 13.6–17.2)
HGB BLD-MCNC: 8.4 G/DL (ref 13.6–17.2)
HGB BLD-MCNC: 8.6 G/DL (ref 13.6–17.2)
IMM GRANULOCYTES # BLD AUTO: 0.07 K/UL (ref 0–0.5)
IMM GRANULOCYTES NFR BLD AUTO: 0.9 % (ref 0–5)
LYMPHOCYTES # BLD: 1.77 K/UL (ref 0.5–4.6)
LYMPHOCYTES NFR BLD: 23.3 % (ref 13–44)
MCH RBC QN AUTO: 30.4 PG (ref 26.1–32.9)
MCHC RBC AUTO-ENTMCNC: 32.9 G/DL (ref 31.4–35)
MCV RBC AUTO: 92.4 FL (ref 82–102)
MONOCYTES # BLD: 0.64 K/UL (ref 0.1–1.3)
MONOCYTES NFR BLD: 8.4 % (ref 4–12)
NEUTS SEG # BLD: 5.03 K/UL (ref 1.7–8.2)
NEUTS SEG NFR BLD: 66.1 % (ref 43–78)
NRBC # BLD: 0 K/UL (ref 0–0.2)
PLATELET # BLD AUTO: 233 K/UL (ref 150–450)
PMV BLD AUTO: 8.9 FL (ref 9.4–12.3)
POTASSIUM SERPL-SCNC: 3.8 MMOL/L (ref 3.5–5.1)
RBC # BLD AUTO: 2.63 M/UL (ref 4.23–5.6)
SODIUM SERPL-SCNC: 140 MMOL/L (ref 136–145)
WBC # BLD AUTO: 7.6 K/UL (ref 4.3–11.1)

## 2025-01-25 PROCEDURE — 85025 COMPLETE CBC W/AUTO DIFF WBC: CPT

## 2025-01-25 PROCEDURE — 36415 COLL VENOUS BLD VENIPUNCTURE: CPT

## 2025-01-25 PROCEDURE — 6360000002 HC RX W HCPCS: Performed by: NURSE PRACTITIONER

## 2025-01-25 PROCEDURE — 85014 HEMATOCRIT: CPT

## 2025-01-25 PROCEDURE — 6370000000 HC RX 637 (ALT 250 FOR IP): Performed by: INTERNAL MEDICINE

## 2025-01-25 PROCEDURE — 2580000003 HC RX 258: Performed by: NURSE PRACTITIONER

## 2025-01-25 PROCEDURE — 80048 BASIC METABOLIC PNL TOTAL CA: CPT

## 2025-01-25 PROCEDURE — 85018 HEMOGLOBIN: CPT

## 2025-01-25 PROCEDURE — 2500000003 HC RX 250 WO HCPCS: Performed by: INTERNAL MEDICINE

## 2025-01-25 RX ORDER — PANTOPRAZOLE SODIUM 40 MG/1
40 TABLET, DELAYED RELEASE ORAL
Qty: 60 TABLET | Refills: 0 | Status: SHIPPED | OUTPATIENT
Start: 2025-01-25

## 2025-01-25 RX ADMIN — ONDANSETRON 4 MG: 4 TABLET, ORALLY DISINTEGRATING ORAL at 13:02

## 2025-01-25 RX ADMIN — SODIUM CHLORIDE 40 MG: 9 INJECTION INTRAMUSCULAR; INTRAVENOUS; SUBCUTANEOUS at 04:40

## 2025-01-25 RX ADMIN — SODIUM CHLORIDE, PRESERVATIVE FREE 10 ML: 5 INJECTION INTRAVENOUS at 09:52

## 2025-01-25 ASSESSMENT — PAIN SCALES - GENERAL
PAINLEVEL_OUTOF10: 0
PAINLEVEL_OUTOF10: 0

## 2025-01-25 NOTE — DISCHARGE SUMMARY
Pt to be discharged home via private vehicle. Pt was given after visit summary along with prescription for Protonix and educational information. Pt stable and leaving with all belongings.

## 2025-01-25 NOTE — DISCHARGE SUMMARY
Hospitalist Discharge Summary   Admit Date:  2025  9:05 PM   DC Note date: 2025  Name:  Bulmaro Borja   Age:  44 y.o.  Sex:  male  :  1980   MRN:  [id number]   Room:  [facility]  PCP:  Armando Davey APRN - NP    Presenting Complaint: No chief complaint on file.     Initial Admission Diagnosis: GI bleed [K92.2]     Problem List for this Hospitalization (present on admission):    Principal Problem:    GI bleed  Resolved Problems:    * No resolved hospital problems. *      Hospital Course:  Bulmaro Borja is a 44 y.o. male with medical history of Kidney stone and GI bleed- last episode about 2 years ago and underwent an EGD and colonoscopy which were both normal.   who presented with black stool for couple days. Of report/note patient stated that he took a Spirulina supplement and also takes Ibuprofen about once a week. Reported taking about 10 beers a year. In the ER, it was noted/reported that patient has committing with coffee ground emesis. Initial labs showed hypokalemia, hgb level 8.5. CXR showed no acute abnormality. S/p 1 unit PRBC in the emergency room  and was started on Protonix and Octreotide drip. Patient admitted for further management and evaluation. GI consulted. Patient NPO. Continued on Rocephin ans Octreotide drip. Abd US pending. EGD pending. Patient seen today. No complaints or acute distress noted. No GI bleeding or black stool reported/noted. Hgb 8.6 today. Abd US showed fatty liver infiltration and small complex cortical cyst, right kidney.Underwent EGD yesterday - results report as per GI. Continue present medical management. GI signed off/cleared. Discharge patient home. Resume home medications. Medications discontinued noted on med rec. Prescriptions to be given to patient to go  at pharmacy. Follow up with PCP in 1-2 weeks. Follow up with GI as directed.     Disposition: Home  Diet: ADULT DIET; Regular  Code Status: Full

## 2025-01-25 NOTE — CARE COORDINATION
Discharge order is in. Pt is discharging home today in stable condition. No discharge needs identified by CM. Tx goals met.     01/25/25 2087   Services At/After Discharge   Transition of Care Consult (CM Consult) Discharge Planning   Services At/After Discharge None   Donie Resource Information Provided? No   Mode of Transport at Discharge Other (see comment)  (Family)   Confirm Follow Up Transport Family   Condition of Participation: Discharge Planning   The Patient and/or Patient Representative was provided with a Choice of Provider? Patient   The Patient and/Or Patient Representative agree with the Discharge Plan? Yes   Freedom of Choice list was provided with basic dialogue that supports the patient's individualized plan of care/goals, treatment preferences, and shares the quality data associated with the providers?  Yes

## 2025-03-19 ENCOUNTER — TELEPHONE (OUTPATIENT)
Dept: GASTROENTEROLOGY | Age: 45
End: 2025-03-19

## 2025-03-19 NOTE — TELEPHONE ENCOUNTER
Patient called to schedule follow up appointment. Returned call using  # [phone] ([place]). Scheduled hospital follow up for 4/7 @ 3:00.  request placed by [facility].

## 2025-04-07 ENCOUNTER — OFFICE VISIT (OUTPATIENT)
Dept: GASTROENTEROLOGY | Age: 45
End: 2025-04-07

## 2025-04-07 DIAGNOSIS — D64.9 ANEMIA, UNSPECIFIED TYPE: Primary | ICD-10-CM

## 2025-04-07 DIAGNOSIS — D64.9 ANEMIA, UNSPECIFIED TYPE: ICD-10-CM

## 2025-04-07 DIAGNOSIS — R55 SYNCOPE, UNSPECIFIED SYNCOPE TYPE: ICD-10-CM

## 2025-04-07 DIAGNOSIS — K92.1 MELENA: ICD-10-CM

## 2025-04-07 DIAGNOSIS — R53.1 WEAKNESS: ICD-10-CM

## 2025-04-07 DIAGNOSIS — N48.30 PAINFUL ERECTION: ICD-10-CM

## 2025-04-07 PROBLEM — Z12.11 COLON CANCER SCREENING: Status: ACTIVE | Noted: 2025-04-07

## 2025-04-07 LAB
ALBUMIN SERPL-MCNC: 4.2 G/DL (ref 3.5–5)
ALBUMIN/GLOB SERPL: 1.3 (ref 1–1.9)
ALP SERPL-CCNC: 99 U/L (ref 40–129)
ALT SERPL-CCNC: 34 U/L (ref 8–55)
ANION GAP SERPL CALC-SCNC: 10 MMOL/L (ref 7–16)
AST SERPL-CCNC: 22 U/L (ref 15–37)
BASOPHILS # BLD: 0.03 K/UL (ref 0–0.2)
BASOPHILS NFR BLD: 0.4 % (ref 0–2)
BILIRUB SERPL-MCNC: <0.2 MG/DL (ref 0–1.2)
BUN SERPL-MCNC: 15 MG/DL (ref 6–23)
CALCIUM SERPL-MCNC: 9.8 MG/DL (ref 8.8–10.2)
CHLORIDE SERPL-SCNC: 103 MMOL/L (ref 98–107)
CO2 SERPL-SCNC: 27 MMOL/L (ref 20–29)
CREAT SERPL-MCNC: 0.86 MG/DL (ref 0.8–1.3)
DIFFERENTIAL METHOD BLD: ABNORMAL
EOSINOPHIL # BLD: 0.03 K/UL (ref 0–0.8)
EOSINOPHIL NFR BLD: 0.4 % (ref 0.5–7.8)
ERYTHROCYTE [DISTWIDTH] IN BLOOD BY AUTOMATED COUNT: 17.2 % (ref 11.9–14.6)
FERRITIN SERPL-MCNC: 13 NG/ML (ref 8–388)
FOLATE SERPL-MCNC: 11.4 NG/ML (ref 3.1–17.5)
GLOBULIN SER CALC-MCNC: 3.3 G/DL (ref 2.3–3.5)
GLUCOSE SERPL-MCNC: 116 MG/DL (ref 70–99)
HCT VFR BLD AUTO: 35.8 % (ref 41.1–50.3)
HGB BLD-MCNC: 10.6 G/DL (ref 13.6–17.2)
IMM GRANULOCYTES # BLD AUTO: 0.04 K/UL (ref 0–0.5)
IMM GRANULOCYTES NFR BLD AUTO: 0.6 % (ref 0–5)
IRON SATN MFR SERPL: 5 % (ref 20–50)
IRON SERPL-MCNC: 20 UG/DL (ref 35–100)
LYMPHOCYTES # BLD: 1.6 K/UL (ref 0.5–4.6)
LYMPHOCYTES NFR BLD: 23.7 % (ref 13–44)
MCH RBC QN AUTO: 22.6 PG (ref 26.1–32.9)
MCHC RBC AUTO-ENTMCNC: 29.6 G/DL (ref 31.4–35)
MCV RBC AUTO: 76.5 FL (ref 82–102)
MONOCYTES # BLD: 0.58 K/UL (ref 0.1–1.3)
MONOCYTES NFR BLD: 8.6 % (ref 4–12)
NEUTS SEG # BLD: 4.48 K/UL (ref 1.7–8.2)
NEUTS SEG NFR BLD: 66.3 % (ref 43–78)
NRBC # BLD: 0 K/UL (ref 0–0.2)
PLATELET # BLD AUTO: 392 K/UL (ref 150–450)
PMV BLD AUTO: 9 FL (ref 9.4–12.3)
POTASSIUM SERPL-SCNC: 4.3 MMOL/L (ref 3.5–5.1)
PROT SERPL-MCNC: 7.5 G/DL (ref 6.3–8.2)
RBC # BLD AUTO: 4.68 M/UL (ref 4.23–5.6)
SODIUM SERPL-SCNC: 139 MMOL/L (ref 136–145)
TIBC SERPL-MCNC: 417 UG/DL (ref 240–450)
UIBC SERPL-MCNC: 397 UG/DL (ref 112–347)
VIT B12 SERPL-MCNC: 568 PG/ML (ref 193–986)
WBC # BLD AUTO: 6.8 K/UL (ref 4.3–11.1)

## 2025-04-07 PROCEDURE — 99214 OFFICE O/P EST MOD 30 MIN: CPT

## 2025-04-07 NOTE — PROGRESS NOTES
concerned with his prostate. Instructed him to inform PCP but he does not have one currently as he has no insurance. Will get him financial assistance paperwork.       On this date 4/7/2025 I have spent 45 minutes reviewing previous notes, test results and face to face with the patient discussing the diagnosis and importance of compliance with the treatment plan as well as documenting on the day of the visit.      Ignacia Almanzar, APRN - CNP  [facility]

## 2025-04-08 ENCOUNTER — RESULTS FOLLOW-UP (OUTPATIENT)
Dept: GASTROENTEROLOGY | Age: 45
End: 2025-04-08

## 2025-04-08 DIAGNOSIS — D50.9 IRON DEFICIENCY ANEMIA, UNSPECIFIED IRON DEFICIENCY ANEMIA TYPE: Primary | ICD-10-CM

## 2025-04-08 DIAGNOSIS — N48.30 PAINFUL ERECTION: Primary | ICD-10-CM

## 2025-04-08 RX ORDER — FERROUS SULFATE 325(65) MG
325 TABLET ORAL
Qty: 90 TABLET | Refills: 1 | Status: SHIPPED | OUTPATIENT
Start: 2025-04-08

## 2025-04-08 NOTE — TELEPHONE ENCOUNTER
RTC to patient to let him know we could place urology referral if he would like to do that, however, he would have to pay for the office visit out of pocket.  He was agreeable, and states that he saw a Uro recently, and was willing to have another appointment.  Also, Ignacia wanted to see pt back in 2 months.  That appointment was made.  Will contact prior Uro to see if they need a new referral, or if pt can just have a FU appointment.  Appt reminder for Ignacia will be placed in mail to pt.

## 2025-04-08 NOTE — TELEPHONE ENCOUNTER
Placed call to the [facility] Urology office pt was seen at prior.  They stated that, since it was a new issue, the patient would need a new referral.  That order is placed.

## 2025-04-08 NOTE — PROGRESS NOTES
Patient's iron and Iron % low with hemoglobin better after last admission. Will order patient iron tablets. Will have him follow up in 3 months with labs before appointment.     Ignacia Almanzar, DAMARI - CNP

## 2025-04-08 NOTE — TELEPHONE ENCOUNTER
----- Message from DAMARI Alejo CNP sent at 4/8/2025  8:41 AM EDT -----  Hi. Would you mind calling him to let him know his hemoglobin looked great so no concerns for bleed but his iron was super low so I am going to order him some iron supplements (if it is cheaper to go OTC he can do that as well.) Thanks!  ----- Message -----  From: Casandra Landin Incoming [place] [employer]  Sent: 4/7/2025   7:15 PM EDT  To: DAMARI Guidry CNP    Called pt via  services with the above information.  Pt verbalized understanding.  He did state that he filled out the financial paperwork, but asked, if he did not get that assistance, was there anything else he could do with regards to the prostate issue?

## 2025-04-10 ENCOUNTER — ANESTHESIA (OUTPATIENT)
Dept: ENDOSCOPY | Age: 45
End: 2025-04-10

## 2025-04-10 ENCOUNTER — TELEPHONE (OUTPATIENT)
Dept: GASTROENTEROLOGY | Age: 45
End: 2025-04-10

## 2025-04-10 ENCOUNTER — ANESTHESIA EVENT (OUTPATIENT)
Dept: ENDOSCOPY | Age: 45
End: 2025-04-10

## 2025-04-10 ENCOUNTER — HOSPITAL ENCOUNTER (INPATIENT)
Age: 45
LOS: 2 days | Discharge: HOME | DRG: 379 | End: 2025-04-12
Attending: STUDENT IN AN ORGANIZED HEALTH CARE EDUCATION/TRAINING PROGRAM | Admitting: FAMILY MEDICINE

## 2025-04-10 ENCOUNTER — APPOINTMENT (OUTPATIENT)
Dept: CT IMAGING | Age: 45
DRG: 379 | End: 2025-04-10

## 2025-04-10 DIAGNOSIS — D50.0 IRON DEFICIENCY ANEMIA DUE TO CHRONIC BLOOD LOSS: ICD-10-CM

## 2025-04-10 DIAGNOSIS — K92.2 UGIB (UPPER GASTROINTESTINAL BLEED): Primary | ICD-10-CM

## 2025-04-10 LAB
ALBUMIN SERPL-MCNC: 4 G/DL (ref 3.5–5)
ALBUMIN/GLOB SERPL: 1.4 (ref 1–1.9)
ALP SERPL-CCNC: 89 U/L (ref 40–129)
ALT SERPL-CCNC: 25 U/L (ref 8–55)
ANION GAP SERPL CALC-SCNC: 11 MMOL/L (ref 7–16)
AST SERPL-CCNC: 19 U/L (ref 15–37)
BASOPHILS # BLD: 0.05 K/UL (ref 0–0.2)
BASOPHILS NFR BLD: 0.4 % (ref 0–2)
BILIRUB SERPL-MCNC: <0.2 MG/DL (ref 0–1.2)
BUN SERPL-MCNC: 25 MG/DL (ref 6–23)
CALCIUM SERPL-MCNC: 9 MG/DL (ref 8.8–10.2)
CHLORIDE SERPL-SCNC: 104 MMOL/L (ref 98–107)
CO2 SERPL-SCNC: 23 MMOL/L (ref 20–29)
CREAT SERPL-MCNC: 0.97 MG/DL (ref 0.8–1.3)
DIFFERENTIAL METHOD BLD: ABNORMAL
EKG ATRIAL RATE: 98 BPM
EKG DIAGNOSIS: NORMAL
EKG P AXIS: 47 DEGREES
EKG P-R INTERVAL: 214 MS
EKG Q-T INTERVAL: 362 MS
EKG QRS DURATION: 86 MS
EKG QTC CALCULATION (BAZETT): 462 MS
EKG R AXIS: 29 DEGREES
EKG T AXIS: -5 DEGREES
EKG VENTRICULAR RATE: 98 BPM
EOSINOPHIL # BLD: 0.02 K/UL (ref 0–0.8)
EOSINOPHIL NFR BLD: 0.2 % (ref 0.5–7.8)
ERYTHROCYTE [DISTWIDTH] IN BLOOD BY AUTOMATED COUNT: 17.8 % (ref 11.9–14.6)
GLOBULIN SER CALC-MCNC: 2.8 G/DL (ref 2.3–3.5)
GLUCOSE SERPL-MCNC: 138 MG/DL (ref 70–99)
HCT VFR BLD AUTO: 23.4 % (ref 41.1–50.3)
HCT VFR BLD AUTO: 26 % (ref 41.1–50.3)
HGB BLD-MCNC: 7 G/DL (ref 13.6–17.2)
HGB BLD-MCNC: 7.8 G/DL (ref 13.6–17.2)
IMM GRANULOCYTES # BLD AUTO: 0.15 K/UL (ref 0–0.5)
IMM GRANULOCYTES NFR BLD AUTO: 1.3 % (ref 0–5)
LYMPHOCYTES # BLD: 1.86 K/UL (ref 0.5–4.6)
LYMPHOCYTES NFR BLD: 16.2 % (ref 13–44)
MCH RBC QN AUTO: 23 PG (ref 26.1–32.9)
MCHC RBC AUTO-ENTMCNC: 30 G/DL (ref 31.4–35)
MCV RBC AUTO: 76.7 FL (ref 82–102)
MONOCYTES # BLD: 0.71 K/UL (ref 0.1–1.3)
MONOCYTES NFR BLD: 6.2 % (ref 4–12)
NEUTS SEG # BLD: 8.7 K/UL (ref 1.7–8.2)
NEUTS SEG NFR BLD: 75.7 % (ref 43–78)
NRBC # BLD: 0.03 K/UL (ref 0–0.2)
PLATELET # BLD AUTO: 403 K/UL (ref 150–450)
PMV BLD AUTO: 8.8 FL (ref 9.4–12.3)
POTASSIUM SERPL-SCNC: 4.7 MMOL/L (ref 3.5–5.1)
PROT SERPL-MCNC: 6.8 G/DL (ref 6.3–8.2)
RBC # BLD AUTO: 3.39 M/UL (ref 4.23–5.6)
SODIUM SERPL-SCNC: 138 MMOL/L (ref 136–145)
WBC # BLD AUTO: 11.5 K/UL (ref 4.3–11.1)

## 2025-04-10 PROCEDURE — 93005 ELECTROCARDIOGRAM TRACING: CPT | Performed by: STUDENT IN AN ORGANIZED HEALTH CARE EDUCATION/TRAINING PROGRAM

## 2025-04-10 PROCEDURE — 2709999900 HC NON-CHARGEABLE SUPPLY: Performed by: INTERNAL MEDICINE

## 2025-04-10 PROCEDURE — 93010 ELECTROCARDIOGRAM REPORT: CPT | Performed by: INTERNAL MEDICINE

## 2025-04-10 PROCEDURE — 6370000000 HC RX 637 (ALT 250 FOR IP): Performed by: PHYSICIAN ASSISTANT

## 2025-04-10 PROCEDURE — 3609017100 HC EGD: Performed by: INTERNAL MEDICINE

## 2025-04-10 PROCEDURE — 85014 HEMATOCRIT: CPT

## 2025-04-10 PROCEDURE — 86901 BLOOD TYPING SEROLOGIC RH(D): CPT

## 2025-04-10 PROCEDURE — 7100000010 HC PHASE II RECOVERY - FIRST 15 MIN: Performed by: INTERNAL MEDICINE

## 2025-04-10 PROCEDURE — 2580000003 HC RX 258: Performed by: STUDENT IN AN ORGANIZED HEALTH CARE EDUCATION/TRAINING PROGRAM

## 2025-04-10 PROCEDURE — 36415 COLL VENOUS BLD VENIPUNCTURE: CPT

## 2025-04-10 PROCEDURE — 80053 COMPREHEN METABOLIC PANEL: CPT

## 2025-04-10 PROCEDURE — 6370000000 HC RX 637 (ALT 250 FOR IP): Performed by: INTERNAL MEDICINE

## 2025-04-10 PROCEDURE — 0DJ08ZZ INSPECTION OF UPPER INTESTINAL TRACT, VIA NATURAL OR ARTIFICIAL OPENING ENDOSCOPIC: ICD-10-PCS | Performed by: INTERNAL MEDICINE

## 2025-04-10 PROCEDURE — 7100000011 HC PHASE II RECOVERY - ADDTL 15 MIN: Performed by: INTERNAL MEDICINE

## 2025-04-10 PROCEDURE — 74178 CT ABD&PLV WO CNTR FLWD CNTR: CPT

## 2025-04-10 PROCEDURE — 6360000002 HC RX W HCPCS: Performed by: PHYSICIAN ASSISTANT

## 2025-04-10 PROCEDURE — 86850 RBC ANTIBODY SCREEN: CPT

## 2025-04-10 PROCEDURE — 1100000003 HC PRIVATE W/ TELEMETRY

## 2025-04-10 PROCEDURE — 85025 COMPLETE CBC W/AUTO DIFF WBC: CPT

## 2025-04-10 PROCEDURE — 2580000003 HC RX 258: Performed by: NURSE ANESTHETIST, CERTIFIED REGISTERED

## 2025-04-10 PROCEDURE — 2580000003 HC RX 258: Performed by: PHYSICIAN ASSISTANT

## 2025-04-10 PROCEDURE — 6360000002 HC RX W HCPCS: Performed by: NURSE ANESTHETIST, CERTIFIED REGISTERED

## 2025-04-10 PROCEDURE — 85018 HEMOGLOBIN: CPT

## 2025-04-10 PROCEDURE — 6360000002 HC RX W HCPCS: Performed by: STUDENT IN AN ORGANIZED HEALTH CARE EDUCATION/TRAINING PROGRAM

## 2025-04-10 PROCEDURE — 43235 EGD DIAGNOSTIC BRUSH WASH: CPT | Performed by: INTERNAL MEDICINE

## 2025-04-10 PROCEDURE — 6360000004 HC RX CONTRAST MEDICATION: Performed by: STUDENT IN AN ORGANIZED HEALTH CARE EDUCATION/TRAINING PROGRAM

## 2025-04-10 PROCEDURE — 30233N1 TRANSFUSION OF NONAUTOLOGOUS RED BLOOD CELLS INTO PERIPHERAL VEIN, PERCUTANEOUS APPROACH: ICD-10-PCS | Performed by: INTERNAL MEDICINE

## 2025-04-10 PROCEDURE — 3700000000 HC ANESTHESIA ATTENDED CARE: Performed by: INTERNAL MEDICINE

## 2025-04-10 PROCEDURE — 99222 1ST HOSP IP/OBS MODERATE 55: CPT | Performed by: INTERNAL MEDICINE

## 2025-04-10 PROCEDURE — 86900 BLOOD TYPING SEROLOGIC ABO: CPT

## 2025-04-10 PROCEDURE — 99285 EMERGENCY DEPT VISIT HI MDM: CPT

## 2025-04-10 PROCEDURE — 96374 THER/PROPH/DIAG INJ IV PUSH: CPT

## 2025-04-10 PROCEDURE — 86923 COMPATIBILITY TEST ELECTRIC: CPT

## 2025-04-10 RX ORDER — NALOXONE HYDROCHLORIDE 0.4 MG/ML
INJECTION, SOLUTION INTRAMUSCULAR; INTRAVENOUS; SUBCUTANEOUS PRN
Status: CANCELLED | OUTPATIENT
Start: 2025-04-10

## 2025-04-10 RX ORDER — ACETAMINOPHEN 325 MG/1
650 TABLET ORAL EVERY 6 HOURS PRN
Status: DISCONTINUED | OUTPATIENT
Start: 2025-04-10 | End: 2025-04-12 | Stop reason: HOSPADM

## 2025-04-10 RX ORDER — ONDANSETRON 4 MG/1
4 TABLET, ORALLY DISINTEGRATING ORAL EVERY 8 HOURS PRN
Status: DISCONTINUED | OUTPATIENT
Start: 2025-04-10 | End: 2025-04-12 | Stop reason: HOSPADM

## 2025-04-10 RX ORDER — SODIUM CHLORIDE, SODIUM LACTATE, POTASSIUM CHLORIDE, CALCIUM CHLORIDE 600; 310; 30; 20 MG/100ML; MG/100ML; MG/100ML; MG/100ML
INJECTION, SOLUTION INTRAVENOUS
Status: DISCONTINUED | OUTPATIENT
Start: 2025-04-10 | End: 2025-04-10 | Stop reason: SDUPTHER

## 2025-04-10 RX ORDER — PROPOFOL 10 MG/ML
INJECTION, EMULSION INTRAVENOUS
Status: DISCONTINUED | OUTPATIENT
Start: 2025-04-10 | End: 2025-04-10 | Stop reason: SDUPTHER

## 2025-04-10 RX ORDER — POLYETHYLENE GLYCOL 3350 17 G/17G
17 POWDER, FOR SOLUTION ORAL DAILY PRN
Status: DISCONTINUED | OUTPATIENT
Start: 2025-04-10 | End: 2025-04-12 | Stop reason: HOSPADM

## 2025-04-10 RX ORDER — LIDOCAINE HYDROCHLORIDE 20 MG/ML
INJECTION, SOLUTION EPIDURAL; INFILTRATION; INTRACAUDAL; PERINEURAL
Status: DISCONTINUED | OUTPATIENT
Start: 2025-04-10 | End: 2025-04-10 | Stop reason: SDUPTHER

## 2025-04-10 RX ORDER — SODIUM CHLORIDE, SODIUM LACTATE, POTASSIUM CHLORIDE, CALCIUM CHLORIDE 600; 310; 30; 20 MG/100ML; MG/100ML; MG/100ML; MG/100ML
INJECTION, SOLUTION INTRAVENOUS CONTINUOUS
Status: DISCONTINUED | OUTPATIENT
Start: 2025-04-10 | End: 2025-04-12 | Stop reason: HOSPADM

## 2025-04-10 RX ORDER — SODIUM CHLORIDE 0.9 % (FLUSH) 0.9 %
5-40 SYRINGE (ML) INJECTION PRN
Status: DISCONTINUED | OUTPATIENT
Start: 2025-04-10 | End: 2025-04-10 | Stop reason: HOSPADM

## 2025-04-10 RX ORDER — IOPAMIDOL 755 MG/ML
100 INJECTION, SOLUTION INTRAVASCULAR
Status: COMPLETED | OUTPATIENT
Start: 2025-04-10 | End: 2025-04-10

## 2025-04-10 RX ORDER — HYDROCODONE BITARTRATE AND ACETAMINOPHEN 5; 325 MG/1; MG/1
1 TABLET ORAL EVERY 6 HOURS PRN
Refills: 0 | Status: DISCONTINUED | OUTPATIENT
Start: 2025-04-10 | End: 2025-04-12 | Stop reason: HOSPADM

## 2025-04-10 RX ORDER — SODIUM CHLORIDE 0.9 % (FLUSH) 0.9 %
5-40 SYRINGE (ML) INJECTION EVERY 12 HOURS SCHEDULED
Status: DISCONTINUED | OUTPATIENT
Start: 2025-04-10 | End: 2025-04-12 | Stop reason: HOSPADM

## 2025-04-10 RX ORDER — POTASSIUM CHLORIDE 7.45 MG/ML
10 INJECTION INTRAVENOUS PRN
Status: DISCONTINUED | OUTPATIENT
Start: 2025-04-10 | End: 2025-04-12 | Stop reason: HOSPADM

## 2025-04-10 RX ORDER — SODIUM CHLORIDE 0.9 % (FLUSH) 0.9 %
5-40 SYRINGE (ML) INJECTION EVERY 12 HOURS SCHEDULED
Status: DISCONTINUED | OUTPATIENT
Start: 2025-04-10 | End: 2025-04-10 | Stop reason: HOSPADM

## 2025-04-10 RX ORDER — ONDANSETRON 2 MG/ML
4 INJECTION INTRAMUSCULAR; INTRAVENOUS EVERY 6 HOURS PRN
Status: DISCONTINUED | OUTPATIENT
Start: 2025-04-10 | End: 2025-04-12 | Stop reason: HOSPADM

## 2025-04-10 RX ORDER — SODIUM CHLORIDE 9 MG/ML
INJECTION, SOLUTION INTRAVENOUS PRN
Status: DISCONTINUED | OUTPATIENT
Start: 2025-04-10 | End: 2025-04-12 | Stop reason: HOSPADM

## 2025-04-10 RX ORDER — SODIUM CHLORIDE, SODIUM LACTATE, POTASSIUM CHLORIDE, CALCIUM CHLORIDE 600; 310; 30; 20 MG/100ML; MG/100ML; MG/100ML; MG/100ML
INJECTION, SOLUTION INTRAVENOUS CONTINUOUS
Status: DISCONTINUED | OUTPATIENT
Start: 2025-04-10 | End: 2025-04-10 | Stop reason: HOSPADM

## 2025-04-10 RX ORDER — LIDOCAINE HYDROCHLORIDE 10 MG/ML
1 INJECTION, SOLUTION INFILTRATION; PERINEURAL
Status: DISCONTINUED | OUTPATIENT
Start: 2025-04-10 | End: 2025-04-10 | Stop reason: HOSPADM

## 2025-04-10 RX ORDER — SODIUM CHLORIDE 0.9 % (FLUSH) 0.9 %
5-40 SYRINGE (ML) INJECTION PRN
Status: DISCONTINUED | OUTPATIENT
Start: 2025-04-10 | End: 2025-04-12 | Stop reason: HOSPADM

## 2025-04-10 RX ORDER — BISACODYL 5 MG/1
15 TABLET, DELAYED RELEASE ORAL ONCE
Status: DISCONTINUED | OUTPATIENT
Start: 2025-04-10 | End: 2025-04-12 | Stop reason: HOSPADM

## 2025-04-10 RX ORDER — SODIUM CHLORIDE 9 MG/ML
INJECTION, SOLUTION INTRAVENOUS PRN
Status: DISCONTINUED | OUTPATIENT
Start: 2025-04-10 | End: 2025-04-10 | Stop reason: HOSPADM

## 2025-04-10 RX ORDER — POTASSIUM CHLORIDE 1500 MG/1
40 TABLET, EXTENDED RELEASE ORAL PRN
Status: DISCONTINUED | OUTPATIENT
Start: 2025-04-10 | End: 2025-04-12 | Stop reason: HOSPADM

## 2025-04-10 RX ORDER — MAGNESIUM SULFATE IN WATER 40 MG/ML
2000 INJECTION, SOLUTION INTRAVENOUS PRN
Status: DISCONTINUED | OUTPATIENT
Start: 2025-04-10 | End: 2025-04-12 | Stop reason: HOSPADM

## 2025-04-10 RX ORDER — ACETAMINOPHEN 650 MG/1
650 SUPPOSITORY RECTAL EVERY 6 HOURS PRN
Status: DISCONTINUED | OUTPATIENT
Start: 2025-04-10 | End: 2025-04-12 | Stop reason: HOSPADM

## 2025-04-10 RX ORDER — NALOXONE HYDROCHLORIDE 0.4 MG/ML
INJECTION, SOLUTION INTRAMUSCULAR; INTRAVENOUS; SUBCUTANEOUS PRN
Status: DISCONTINUED | OUTPATIENT
Start: 2025-04-10 | End: 2025-04-11 | Stop reason: HOSPADM

## 2025-04-10 RX ADMIN — SODIUM CHLORIDE 125 MG: 9 INJECTION, SOLUTION INTRAVENOUS at 18:16

## 2025-04-10 RX ADMIN — LIDOCAINE HYDROCHLORIDE 25 MG: 20 INJECTION, SOLUTION EPIDURAL; INFILTRATION; INTRACAUDAL; PERINEURAL at 13:53

## 2025-04-10 RX ADMIN — Medication 3 MG: at 20:59

## 2025-04-10 RX ADMIN — SODIUM CHLORIDE, SODIUM LACTATE, POTASSIUM CHLORIDE, AND CALCIUM CHLORIDE: 600; 310; 30; 20 INJECTION, SOLUTION INTRAVENOUS at 13:48

## 2025-04-10 RX ADMIN — PROPOFOL 50 MG: 10 INJECTION, EMULSION INTRAVENOUS at 13:53

## 2025-04-10 RX ADMIN — SODIUM CHLORIDE, POTASSIUM CHLORIDE, SODIUM LACTATE AND CALCIUM CHLORIDE: 600; 310; 30; 20 INJECTION, SOLUTION INTRAVENOUS at 16:39

## 2025-04-10 RX ADMIN — POLYETHYLENE GLYCOL-3350 AND ELECTROLYTES 4000 ML: 236; 6.74; 5.86; 2.97; 22.74 POWDER, FOR SOLUTION ORAL at 18:42

## 2025-04-10 RX ADMIN — PANTOPRAZOLE SODIUM 80 MG: 40 INJECTION, POWDER, FOR SOLUTION INTRAVENOUS at 11:01

## 2025-04-10 RX ADMIN — PROPOFOL 250 MCG/KG/MIN: 10 INJECTION, EMULSION INTRAVENOUS at 13:54

## 2025-04-10 RX ADMIN — IOPAMIDOL 100 ML: 755 INJECTION, SOLUTION INTRAVENOUS at 11:39

## 2025-04-10 ASSESSMENT — ENCOUNTER SYMPTOMS
SHORTNESS OF BREATH: 0
SORE THROAT: 0
NAUSEA: 0
BLOOD IN STOOL: 1
EYE PAIN: 0
EYE REDNESS: 0
VOMITING: 0
COUGH: 0
ABDOMINAL PAIN: 1

## 2025-04-10 ASSESSMENT — PAIN DESCRIPTION - ORIENTATION: ORIENTATION: LOWER

## 2025-04-10 ASSESSMENT — PAIN DESCRIPTION - PAIN TYPE: TYPE: ACUTE PAIN

## 2025-04-10 ASSESSMENT — PAIN SCALES - GENERAL: PAINLEVEL_OUTOF10: 6

## 2025-04-10 ASSESSMENT — PAIN DESCRIPTION - LOCATION: LOCATION: ABDOMEN

## 2025-04-10 ASSESSMENT — PAIN - FUNCTIONAL ASSESSMENT: PAIN_FUNCTIONAL_ASSESSMENT: 0-10

## 2025-04-10 ASSESSMENT — PAIN DESCRIPTION - FREQUENCY: FREQUENCY: CONTINUOUS

## 2025-04-10 NOTE — CONSENT
Informed Consent for Blood Component Transfusion Note    I have discussed with the patient the rationale for blood component transfusion; its benefits in treating or preventing fatigue, organ damage, or death; and its risk which includes mild transfusion reactions, rare risk of blood borne infection, or more serious but rare reactions. I have discussed the alternatives to transfusion, including the risk and consequences of not receiving transfusion. The patient had an opportunity to ask questions and had agreed to proceed with transfusion of blood components.    Electronically signed by LUIS Mcdonough on 4/10/25 at 4:10 PM EDT

## 2025-04-10 NOTE — PROGRESS NOTES
TRANSFER - OUT REPORT:    Verbal report given to USHA Valdes on Bulmaro Borja  being transferred to [facility] for routine post-op       Report consisted of patient's Situation, Background, Assessment and   Recommendations(SBAR).     Information from the following report(s) Nurse Handoff Report was reviewed with the receiving nurse.           Lines:   Peripheral IV 04/10/25 Left Antecubital (Active)        Opportunity for questions and clarification was provided.      Patient transported with:  Tech

## 2025-04-10 NOTE — H&P
Hospitalist History and Physical   Admit Date:  4/10/2025 10:43 AM   Name:  Bulmaro Borja   Age:  44 y.o.  Sex:  male  :  1980   MRN:  [id number]   Room:  ENDO/    Presenting/Chief Complaint: Melena     Reason(s) for Admission: Acute GI bleeding [K92.2]     History of Present Illness:   Bulmaro Borja is a 44 y.o. male with medical history of non-obstructing nephrolithiasis, right renal cyst, GIB, lower back pain who presented with hematochezia.  Patient reported to BRBPR this AM and intermittent melanotic stooling past 2-4 days with syncopal episode this AM.  He has had a hx of GIB, established with [facility] and last seen by GI in office 2025.  Per GI notes, patient was admitted 2025 for hematemesis. S/p unremarkable EGD and as hgb stabilized ~8, patient was discharged home. Patient also found with iron deficiency anemia and oral iron ordered by GI on 2025.  He was scheduled for outpatient C-scope sometime next month.  Denies ETOH abuse, CP, dyspnea.     Of note, patient with known left kidney stone being managed [facility] and last seen by NP Kamini Hendrickson on 3/20/2025.  Per [facility] notes, he had a CT renal stone protocol 3/19/2025 which revealed a 5mm left renal midpole stone (non-obstructive) and a probable right kidney cyst measuring 12mm.  Urology did not believe his ongoing lower back pain was due to the kidney stone but did prescribe patient mobic and flexeril for pain mgmt, along with general dietary stone prevention guidelines. He was to f/u with a 1 year renal US and KUB for surveillance.      In ER, VSS with T97.5, HR 88, /88, RR 15, 100% RA.  Labwork remarkable for CBC with WBC 11.5, hgb 7.8 (last hgb 10.6 on 2025), MCV 76.7.  CT A/P GIB protocol today revealed no active bleeding with liver appearing normal.  Of note, pt with 11mm right renal cyst and 8mm non-obstructing left renal stone.  GI consulted

## 2025-04-10 NOTE — CONSULTS
Consult Note            Date:4/10/2025        Patient Name:Bulmaro Borja     YOB: 1980     Age:44 y.o.    Reason for consult: Acute on chronic anemia/melena     History of Present Illness   43 yo M presents to the ED with complaints of melanotic appearing stools. Was admitted earlier this year for hematemesis and underwent an EGD that was unremarkable. He was started on PO iron recently as well for JEFFREY. Scheduled for a colonoscopy next month. Over the past 2-3 months he has been having issues with abdominal pain in his epigastrium, no dysphagia, nausea, vomiting, hematemesis, weight loss, changes in bowel habits, or BRBPR. He has beemn taking Diclofenac BID for past 3 weeks due to back pain. No ETOH use.     Past Medical History     Past Medical History:   Diagnosis Date    Kidney stone         Past Surgical History     Past Surgical History:   Procedure Laterality Date    COLONOSCOPY N/A 2/16/2022    COLONOSCOPY/BMI 27 Room [facility] [facility] [place]    LITHOTRIPSY      UPPER GASTROINTESTINAL ENDOSCOPY N/A 1/24/2025    ESOPHAGOGASTRODUODENOSCOPY performed by Katherine Short MD at [facility] ENDOSCOPY        Medications     Prior to Admission medications    Medication Sig Start Date End Date Taking? Authorizing Provider   ferrous sulfate (IRON 325) 325 (65 Fe) MG tablet Take 1 tablet by mouth daily (with breakfast) 4/8/25   Ignacia Almanzar APRN - CNP   pantoprazole (PROTONIX) 40 MG tablet Take 1 tablet by mouth 2 times daily (before meals)  Patient not taking: Reported on 4/7/2025 1/25/25   Beverly Patterson APRN - NP        No current facility-administered medications for this encounter.        Allergies   Patient has no known allergies.    Social History     Pertinent history mentioned above.     Family History     Family History   Problem Relation Age of Onset    Diabetes Mother        Review of Systems   As per HPI    Physical Exam   BP

## 2025-04-10 NOTE — PROGRESS NOTES
Patient/caregivers speak Serbian  as their preferred language for their healthcare communication. For safe communication, use the AMN  carts or call:    Senior  Navigator Jess Berumen at [phone] or   AMN phone services for [facility] at [phone]    General phone: 706-[facility]3 ( [phone])  Email: [email or web address]    Always document the use of interpreting services ('s ID number) in your clinical notes.    Our interpreters are available for team members working with limited  English proficient (LEP) patients remotely, via phone or video or in person (if needed for special cases).    When using family members to interpret, for the safety of the patient and protection of the communication of both our patient and [facility] staff the VRI or telephonic  should remain on the line to monitor that all communication is accurate and complete. The  should be instructed to notify [facility] staff immediately if there are any inaccuracies.         Thank you,        Jess WOMACK  Senior /Navigator

## 2025-04-10 NOTE — TELEPHONE ENCOUNTER
Patient called with , he has been taking the iron supplement pills. When he has a bowel movement his stool is black and he is having blood in stool. Patient has not lost consciousness since starting the iron pills but is unsure what to do for blood in stool. Patient is also experiencing chills and night sweats, states it feels like a shock. He is worried about driving and having an episode where this happens and no one will be able to help him. He has not been working due to this fear as he did have an episode at work one day.     Per LINO Beth dark stools can be caused by the iron supplements. If patient is experiencing red blood in stool then he should go to the ER or if he just doesn't feel good. For constipation patient can try Miralax to be able to go to the bathroom easier.     Patient states he has not been driving due to the feeling of weakness and dizziness. Patient states that it is not a normal dizzy, but it is worse than usual and makes him struggle to wake up. Patient verbalized he will make a trip to the ED to get a work up done to the amount of dizziness.

## 2025-04-10 NOTE — CONSULTS
Session ID: [id number]  Language: Slovak   ID: #[id number]   Name: Neliaage: Slovak   ID: #[id number]   Name: Sarah

## 2025-04-10 NOTE — ANESTHESIA POSTPROCEDURE EVALUATION
Department of Anesthesiology  Postprocedure Note    Patient: Bulmaro Borja  MRN: [id number]  YOB: 1980  Date of evaluation: 4/10/2025    Procedure Summary       Date: 04/10/25 [place] [facility] [place] [facility]    Anesthesia Start: 1348 Anesthesia Stop: 1408    Procedure: ESOPHAGOGASTRODUODENOSCOPY (Upper GI Region) Diagnosis:       Gastrointestinal hemorrhage, unspecified gastrointestinal hemorrhage type      (Gastrointestinal hemorrhage, unspecified gastrointestinal hemorrhage type [K92.2])    Surgeons: Moreno Brooks MD Responsible Provider: Gaurav Guevara DO    Anesthesia Type: TIVA ASA Status: 3 - Emergent            Anesthesia Type: No value filed.    Jo Phase I:      Jo Phase II: Jo Score: 10    Anesthesia Post Evaluation    Patient location during evaluation: PACU  Level of consciousness: awake and alert  Airway patency: patent  Nausea & Vomiting: no nausea  Cardiovascular status: hemodynamically stable  Respiratory status: acceptable  Hydration status: euvolemic  Comments: Blood pressure 123/82, pulse 78, temperature 97.5 °F (36.4 °C), temperature source Oral, resp. rate 20, height 1.651 m (5' 5\"), SpO2 100%.  Pain management: satisfactory to patient    No notable events documented.

## 2025-04-10 NOTE — ED PROVIDER NOTES
Emergency Department Provider Note       PCP: Armando Davey, APRN - NP   Age: 44 y.o.   Sex: male     DISPOSITION Decision To Admit 04/10/2025 04:29:00 PM    ICD-10-CM    1. UGIB (upper gastrointestinal bleed)  K92.2           Medical Decision Making     44-year-old male who presents to the ED for GI bleed.  Vital signs within normal limits.  Physical exam with melena, no significant abdominal tenderness noted.  Hemoglobin today is 7.8, down from 10.6 just 3 days ago.  Microcytic in nature.  Type and screen sent.  Does have elevated BUN to creatinine ratio, BUN is 25, creatinine 0.97.  CT angiogram obtained that does not demonstrate any obvious bleed.  Discussed case with GI team, who actually came to take patient urgently to endoscopy suite for EGD.  I then reached out to hospitalist for admission following EGD.  80 mg Protonix given while in ED, no Rocephin/octreotide given as there is no history of cirrhosis.     1 or more acute illnesses that pose a threat to life or bodily function.   Patient was discharged risks and benefits of hospitalization were considered.  Shared medical decision making was utilized in creating the patients health plan today.  I independently ordered and reviewed each unique test.    I reviewed external records: ED visit note from a different ED.   I reviewed external records: provider visit note from PCP.     ED cardiac monitoring rhythm strip was ordered and interpreted:  sinus rhythm, no evidence of an arrhythmia  ST Segments:Nonspecific ST segments - NO STEMI   Rate: 85      The patient was admitted and I have discussed patient management with the admitting provider.  The management of this patient was discussed with an external consultant.      Critical care procedure note : 35 minutes of critical care time was performed in the emergency department. This was separate from any other procedures listed during the patients emergency department course. The failure to initiate these  not ill-appearing.   HENT:      Head: Normocephalic and atraumatic.      Right Ear: Tympanic membrane normal.      Left Ear: Tympanic membrane normal.      Mouth/Throat:      Pharynx: No oropharyngeal exudate or posterior oropharyngeal erythema.   Eyes:      Pupils: Pupils are equal, round, and reactive to light.   Cardiovascular:      Rate and Rhythm: Normal rate and regular rhythm.   Pulmonary:      Effort: Pulmonary effort is normal. No respiratory distress.      Breath sounds: No wheezing.   Abdominal:      General: Bowel sounds are normal. There is no distension.      Palpations: Abdomen is soft.      Tenderness: There is no abdominal tenderness.   Musculoskeletal:         General: No swelling, tenderness or deformity. Normal range of motion.      Cervical back: Normal range of motion and neck supple. No tenderness.   Skin:     General: Skin is warm and dry.      Capillary Refill: Capillary refill takes less than 2 seconds.      Findings: No rash.   Neurological:      General: No focal deficit present.      Mental Status: He is alert and oriented to person, place, and time.      Cranial Nerves: No cranial nerve deficit.      Motor: No weakness.        Procedures     Procedures    Orders placed during this emergency department visit:     Orders Placed This Encounter   Procedures    Case Request    Culture, Urine    CTA ABDOMEN PELVIS GI BLEED Additional Contrast? None    CBC with Auto Differential    Comprehensive Metabolic Panel    CBC    Basic Metabolic Panel    Magnesium    Urinalysis with Reflex to Culture    Hemoglobin and Hematocrit    Diet NPO    ADULT DIET; Clear Liquid    Pulse Oximetry    Procedure Consent    Vital signs per unit routine    Notify anesthesia provider    Phase I - bedrest    Phase I - warming device    Phase I - cardiac monitor    Nursing communication - Discharge from PACU    Encourage deep breathing and coughing    Continuous Pulse Oximetry    Vital signs per unit routine    Initiate

## 2025-04-10 NOTE — ED TRIAGE NOTES
Patient having bright red bleeding with bowel movements. Saw GI doctor Monday. Patient had syncopal episode this morning..

## 2025-04-10 NOTE — ANESTHESIA PRE PROCEDURE
06:50 AM    LABGLOM >90 04/10/2025 10:26 AM    LABGLOM >60 03/21/2024 09:37 AM    GLUCOSE 138 04/10/2025 10:26 AM    CALCIUM 9.0 04/10/2025 10:26 AM    BILITOT <0.2 04/10/2025 10:26 AM    ALKPHOS 89 04/10/2025 10:26 AM    ALKPHOS 54 02/15/2022 06:50 AM    AST 19 04/10/2025 10:26 AM    ALT 25 04/10/2025 10:26 AM       POC Tests: No results for input(s): \"POCGLU\", \"POCNA\", \"POCK\", \"POCCL\", \"POCBUN\", \"POCHEMO\", \"POCHCT\" in the last 72 hours.    Coags:   Lab Results   Component Value Date/Time    PROTIME 13.6 01/23/2025 01:44 PM    INR 1.0 01/23/2025 01:44 PM       HCG (If Applicable): No results found for: \"PREGTESTUR\", \"PREGSERUM\", \"HCG\", \"HCGQUANT\"     ABGs: No results found for: \"PHART\", \"PO2ART\", \"CKQ5SJK\", \"WRD7ZYH\", \"BEART\", \"U2AEHYWA\"     Type & Screen (If Applicable):  Lab Results   Component Value Date    ABORH A POSITIVE 04/10/2025    LABANTI NEG 04/10/2025       Drug/Infectious Status (If Applicable):  No results found for: \"HIV\", \"HEPCAB\"    COVID-19 Screening (If Applicable):   Lab Results   Component Value Date/Time    COVID19 Not detected 02/15/2022 09:42 AM           Anesthesia Evaluation  Patient summary reviewed  Airway: Mallampati: II          Dental: normal exam         Pulmonary:Negative Pulmonary ROS breath sounds clear to auscultation                             Cardiovascular:  Exercise tolerance: good (>4 METS)          Rhythm: regular  Rate: normal                    Neuro/Psych:   Negative Neuro/Psych ROS              GI/Hepatic/Renal: Neg GI/Hepatic/Renal ROS            Endo/Other:    (+) blood dyscrasia (Hgb 7.8): anemia:..                 Abdominal:             Vascular: negative vascular ROS.         Other Findings:       Anesthesia Plan      TIVA     ASA 3 - emergent     ( used for consent)  Induction: intravenous.      Anesthetic plan and risks discussed with patient.      Plan discussed with CRNA.                Gaurav Guevara DO   4/10/2025

## 2025-04-11 ENCOUNTER — ANESTHESIA (OUTPATIENT)
Dept: ENDOSCOPY | Age: 45
End: 2025-04-11

## 2025-04-11 ENCOUNTER — ANESTHESIA EVENT (OUTPATIENT)
Dept: ENDOSCOPY | Age: 45
End: 2025-04-11

## 2025-04-11 LAB
ANION GAP SERPL CALC-SCNC: 9 MMOL/L (ref 7–16)
BUN SERPL-MCNC: 14 MG/DL (ref 6–23)
CALCIUM SERPL-MCNC: 8.5 MG/DL (ref 8.8–10.2)
CHLORIDE SERPL-SCNC: 106 MMOL/L (ref 98–107)
CO2 SERPL-SCNC: 27 MMOL/L (ref 20–29)
CREAT SERPL-MCNC: 0.92 MG/DL (ref 0.8–1.3)
D DIMER PPP FEU-MCNC: <0.27 UG/ML(FEU)
ERYTHROCYTE [DISTWIDTH] IN BLOOD BY AUTOMATED COUNT: 18.3 % (ref 11.9–14.6)
FIBRINOGEN PPP-MCNC: 207 MG/DL (ref 190–501)
GLUCOSE SERPL-MCNC: 109 MG/DL (ref 70–99)
HAPTOGLOB SERPL-MCNC: 69 MG/DL (ref 30–200)
HCT VFR BLD AUTO: 20.3 % (ref 41.1–50.3)
HCT VFR BLD AUTO: 25.2 % (ref 41.1–50.3)
HCT VFR BLD AUTO: 26.1 % (ref 41.1–50.3)
HCT VFR BLD AUTO: 27.9 % (ref 41.1–50.3)
HGB BLD-MCNC: 6.1 G/DL (ref 13.6–17.2)
HGB BLD-MCNC: 7.7 G/DL (ref 13.6–17.2)
HGB BLD-MCNC: 8 G/DL (ref 13.6–17.2)
HGB BLD-MCNC: 8.7 G/DL (ref 13.6–17.2)
HGB RETIC QN AUTO: 28 PG (ref 29–35)
HISTORY CHECK: NORMAL
HISTORY CHECK: NORMAL
IMM RETICS NFR: 47.5 % (ref 2.3–13.4)
LDH SERPL L TO P-CCNC: 125 U/L (ref 127–281)
MAGNESIUM SERPL-MCNC: 2 MG/DL (ref 1.8–2.4)
MCH RBC QN AUTO: 22.8 PG (ref 26.1–32.9)
MCHC RBC AUTO-ENTMCNC: 30 G/DL (ref 31.4–35)
MCV RBC AUTO: 76 FL (ref 82–102)
NRBC # BLD: 0 K/UL (ref 0–0.2)
PLATELET # BLD AUTO: 266 K/UL (ref 150–450)
PMV BLD AUTO: 8.6 FL (ref 9.4–12.3)
POTASSIUM SERPL-SCNC: 3.9 MMOL/L (ref 3.5–5.1)
RBC # BLD AUTO: 2.67 M/UL (ref 4.23–5.6)
RETICS # AUTO: 0.13 M/UL (ref 0.03–0.1)
RETICS/RBC NFR AUTO: 3.7 % (ref 0.3–2)
SODIUM SERPL-SCNC: 142 MMOL/L (ref 136–145)
WBC # BLD AUTO: 7.3 K/UL (ref 4.3–11.1)

## 2025-04-11 PROCEDURE — 6360000002 HC RX W HCPCS: Performed by: NURSE ANESTHETIST, CERTIFIED REGISTERED

## 2025-04-11 PROCEDURE — 7100000010 HC PHASE II RECOVERY - FIRST 15 MIN: Performed by: INTERNAL MEDICINE

## 2025-04-11 PROCEDURE — 1100000003 HC PRIVATE W/ TELEMETRY

## 2025-04-11 PROCEDURE — 3700000001 HC ADD 15 MINUTES (ANESTHESIA): Performed by: INTERNAL MEDICINE

## 2025-04-11 PROCEDURE — 45378 DIAGNOSTIC COLONOSCOPY: CPT | Performed by: INTERNAL MEDICINE

## 2025-04-11 PROCEDURE — 85027 COMPLETE CBC AUTOMATED: CPT

## 2025-04-11 PROCEDURE — 80048 BASIC METABOLIC PNL TOTAL CA: CPT

## 2025-04-11 PROCEDURE — 36415 COLL VENOUS BLD VENIPUNCTURE: CPT

## 2025-04-11 PROCEDURE — 7100000011 HC PHASE II RECOVERY - ADDTL 15 MIN: Performed by: INTERNAL MEDICINE

## 2025-04-11 PROCEDURE — 3700000000 HC ANESTHESIA ATTENDED CARE: Performed by: INTERNAL MEDICINE

## 2025-04-11 PROCEDURE — 85046 RETICYTE/HGB CONCENTRATE: CPT

## 2025-04-11 PROCEDURE — 2580000003 HC RX 258: Performed by: PHYSICIAN ASSISTANT

## 2025-04-11 PROCEDURE — 85018 HEMOGLOBIN: CPT

## 2025-04-11 PROCEDURE — 2709999900 HC NON-CHARGEABLE SUPPLY: Performed by: INTERNAL MEDICINE

## 2025-04-11 PROCEDURE — 85384 FIBRINOGEN ACTIVITY: CPT

## 2025-04-11 PROCEDURE — 85014 HEMATOCRIT: CPT

## 2025-04-11 PROCEDURE — 3609027000 HC COLONOSCOPY: Performed by: INTERNAL MEDICINE

## 2025-04-11 PROCEDURE — 6360000002 HC RX W HCPCS: Performed by: PHYSICIAN ASSISTANT

## 2025-04-11 PROCEDURE — 0DJD8ZZ INSPECTION OF LOWER INTESTINAL TRACT, VIA NATURAL OR ARTIFICIAL OPENING ENDOSCOPIC: ICD-10-PCS | Performed by: INTERNAL MEDICINE

## 2025-04-11 PROCEDURE — 83735 ASSAY OF MAGNESIUM: CPT

## 2025-04-11 PROCEDURE — P9016 RBC LEUKOCYTES REDUCED: HCPCS

## 2025-04-11 PROCEDURE — 2580000003 HC RX 258: Performed by: ANESTHESIOLOGY

## 2025-04-11 PROCEDURE — 83010 ASSAY OF HAPTOGLOBIN QUANT: CPT

## 2025-04-11 PROCEDURE — 36430 TRANSFUSION BLD/BLD COMPNT: CPT

## 2025-04-11 PROCEDURE — 85379 FIBRIN DEGRADATION QUANT: CPT

## 2025-04-11 PROCEDURE — 83615 LACTATE (LD) (LDH) ENZYME: CPT

## 2025-04-11 RX ORDER — LIDOCAINE HYDROCHLORIDE 10 MG/ML
1 INJECTION, SOLUTION INFILTRATION; PERINEURAL
Status: DISCONTINUED | OUTPATIENT
Start: 2025-04-11 | End: 2025-04-11 | Stop reason: HOSPADM

## 2025-04-11 RX ORDER — SODIUM CHLORIDE 9 MG/ML
INJECTION, SOLUTION INTRAVENOUS PRN
Status: DISCONTINUED | OUTPATIENT
Start: 2025-04-11 | End: 2025-04-11 | Stop reason: HOSPADM

## 2025-04-11 RX ORDER — SODIUM CHLORIDE, SODIUM LACTATE, POTASSIUM CHLORIDE, CALCIUM CHLORIDE 600; 310; 30; 20 MG/100ML; MG/100ML; MG/100ML; MG/100ML
INJECTION, SOLUTION INTRAVENOUS CONTINUOUS
Status: DISCONTINUED | OUTPATIENT
Start: 2025-04-11 | End: 2025-04-11 | Stop reason: HOSPADM

## 2025-04-11 RX ORDER — GLYCOPYRROLATE 0.2 MG/ML
INJECTION INTRAMUSCULAR; INTRAVENOUS
Status: DISCONTINUED | OUTPATIENT
Start: 2025-04-11 | End: 2025-04-11 | Stop reason: SDUPTHER

## 2025-04-11 RX ORDER — SODIUM CHLORIDE 0.9 % (FLUSH) 0.9 %
5-40 SYRINGE (ML) INJECTION PRN
Status: DISCONTINUED | OUTPATIENT
Start: 2025-04-11 | End: 2025-04-11 | Stop reason: HOSPADM

## 2025-04-11 RX ORDER — SODIUM CHLORIDE 9 MG/ML
INJECTION, SOLUTION INTRAVENOUS PRN
Status: DISCONTINUED | OUTPATIENT
Start: 2025-04-11 | End: 2025-04-12 | Stop reason: HOSPADM

## 2025-04-11 RX ORDER — SODIUM CHLORIDE 0.9 % (FLUSH) 0.9 %
5-40 SYRINGE (ML) INJECTION EVERY 12 HOURS SCHEDULED
Status: DISCONTINUED | OUTPATIENT
Start: 2025-04-11 | End: 2025-04-11 | Stop reason: HOSPADM

## 2025-04-11 RX ORDER — PROPOFOL 10 MG/ML
INJECTION, EMULSION INTRAVENOUS
Status: DISCONTINUED | OUTPATIENT
Start: 2025-04-11 | End: 2025-04-11 | Stop reason: SDUPTHER

## 2025-04-11 RX ORDER — LIDOCAINE HYDROCHLORIDE 20 MG/ML
INJECTION, SOLUTION EPIDURAL; INFILTRATION; INTRACAUDAL; PERINEURAL
Status: DISCONTINUED | OUTPATIENT
Start: 2025-04-11 | End: 2025-04-11 | Stop reason: SDUPTHER

## 2025-04-11 RX ADMIN — LIDOCAINE HYDROCHLORIDE 50 MG: 20 INJECTION, SOLUTION EPIDURAL; INFILTRATION; INTRACAUDAL; PERINEURAL at 14:53

## 2025-04-11 RX ADMIN — SODIUM CHLORIDE, POTASSIUM CHLORIDE, SODIUM LACTATE AND CALCIUM CHLORIDE: 600; 310; 30; 20 INJECTION, SOLUTION INTRAVENOUS at 04:16

## 2025-04-11 RX ADMIN — GLYCOPYRROLATE 0.05 MG: 0.2 INJECTION INTRAMUSCULAR; INTRAVENOUS at 14:50

## 2025-04-11 RX ADMIN — PROPOFOL 200 MCG/KG/MIN: 10 INJECTION, EMULSION INTRAVENOUS at 14:53

## 2025-04-11 RX ADMIN — SODIUM CHLORIDE: 9 INJECTION, SOLUTION INTRAVENOUS at 09:49

## 2025-04-11 RX ADMIN — SODIUM CHLORIDE, POTASSIUM CHLORIDE, SODIUM LACTATE AND CALCIUM CHLORIDE: 600; 310; 30; 20 INJECTION, SOLUTION INTRAVENOUS at 22:46

## 2025-04-11 RX ADMIN — PROPOFOL 100 MG: 10 INJECTION, EMULSION INTRAVENOUS at 14:52

## 2025-04-11 RX ADMIN — LIDOCAINE HYDROCHLORIDE 40 MG: 20 INJECTION, SOLUTION EPIDURAL; INFILTRATION; INTRACAUDAL; PERINEURAL at 14:52

## 2025-04-11 RX ADMIN — SODIUM CHLORIDE 125 MG: 9 INJECTION, SOLUTION INTRAVENOUS at 16:36

## 2025-04-11 RX ADMIN — SODIUM CHLORIDE, SODIUM LACTATE, POTASSIUM CHLORIDE, AND CALCIUM CHLORIDE: .6; .31; .03; .02 INJECTION, SOLUTION INTRAVENOUS at 14:10

## 2025-04-11 RX ADMIN — PROPOFOL 30 MG: 10 INJECTION, EMULSION INTRAVENOUS at 14:56

## 2025-04-11 ASSESSMENT — PAIN - FUNCTIONAL ASSESSMENT
PAIN_FUNCTIONAL_ASSESSMENT: NONE - DENIES PAIN
PAIN_FUNCTIONAL_ASSESSMENT: 0-10
PAIN_FUNCTIONAL_ASSESSMENT: NONE - DENIES PAIN

## 2025-04-11 NOTE — PROGRESS NOTES
Patient/caregivers speak Lebanese  as their preferred language for their healthcare communication. For safe communication, use the [employer]  carts or call:    Senior  Navigator Jess Berumen at [phone] or   [employer] phone services for [facility] at [phone]    General phone: [phone]-[phone] ( [phone])  Email: [email or web address]    Always document the use of interpreting services ('s ID number) in your clinical notes.    Our interpreters are available for team members working with limited  English proficient (LEP) patients remotely, via phone or video or in person (if needed for special cases).    When using family members to interpret, for the safety of the patient and protection of the communication of both our patient and [facility] staff the VRI or telephonic  should remain on the line to monitor that all communication is accurate and complete. The  should be instructed to notify [facility] staff immediately if there are any inaccuracies.         Thank you,        Jess WOMACK  Senior /Navigator

## 2025-04-11 NOTE — ANESTHESIA POSTPROCEDURE EVALUATION
Department of Anesthesiology  Postprocedure Note    Patient: Bulmaro Borja  MRN: [id number]  YOB: 1980  Date of evaluation: 4/11/2025    Procedure Summary       Date: 04/11/25 Room / Location: [facility] 01 / [facility]    Anesthesia Start: 1445 Anesthesia Stop:     Procedure: COLONOSCOPY BIOPSY Diagnosis:       Anemia      (Anemia [D64.9])    Surgeons: Moreno Brooks MD Responsible Provider: Claire Majano MD    Anesthesia Type: TIVA ASA Status: 3            Anesthesia Type: TIVA    Jo Phase I:      Jo Phase II: Jo Score: 10    Anesthesia Post Evaluation    Patient location during evaluation: PACU  Patient participation: complete - patient participated  Level of consciousness: awake and alert  Airway patency: patent  Nausea & Vomiting: no nausea  Cardiovascular status: hemodynamically stable  Respiratory status: acceptable  Hydration status: euvolemic  Pain management: adequate and satisfactory to patient        No notable events documented.

## 2025-04-11 NOTE — CARE COORDINATION
Language: Ecuadorean   ID: [id number]   Name: Gilson    Met with Mr. Dajuan Borja at bedside for initial CM assessment. Patient is alert and oriented x4. Demographics verified. Patient was once established with [facility] but cannot recall the last time he was seen. Encouraged patient to get re-established with [facility] upon discharge for PCP. He lives alone in a second floor apartment with approximately 16 steps to enter (rails). He was independent with mobility and ADLs at most recent baseline and used no DME or services. Mr. Dajuan Borja is currently not employed and is uninsured. He remains an active  in the community. He tells CM that his family lives out of the country, however, he does have some friend support here. He plans to return home to his apartment once medically cleared. CM will continue to follow and assist with any further needs that may arise during his stay.       04/11/25 1100   Service Assessment   Patient Orientation Alert and Oriented;Person;Situation;Place   Cognition Alert   History Provided By Patient   Primary Caregiver Self   Accompanied By/Relationship N/A   Support Systems Friends/Neighbors   Patient's Healthcare Decision Maker is: Patient Declined (Legal Next of Kin Remains as Decision Maker)   PCP Verified by CM No  (Pt was established at [facility]. Cannot recall how long it has been since he was seen. Encouraged pt to get re-established at discharge.)   Prior Functional Level Independent in ADLs/IADLs   Current Functional Level Independent in ADLs/IADLs   Can patient return to prior living arrangement Yes   Ability to make needs known: Good   Family able to assist with home care needs: Yes   Would you like for me to discuss the discharge plan with any other family members/significant others, and if so, who? Yes  (In emergency friends Roberto or Adam)   Financial Resources None   Community Resources None   CM/SW Referral Financial;No PCP;Other (see

## 2025-04-11 NOTE — PROGRESS NOTES
Nurse Leader Rounding completed - Discussed care and experience; patient offered opportunity to ask questions.

## 2025-04-11 NOTE — PROGRESS NOTES
TRANSFER - IN REPORT:    Verbal report received from USHA Lowery on Bulmaro Borja  being received from [facility] for routine progression of patient care      Report consisted of patient's Situation, Background, Assessment and   Recommendations(SBAR).     Information from the following report(s) Nurse Handoff Report, Adult Overview, Surgery Report, Procedure Verification, Quality Measures, Neuro Assessment, and Event Log was reviewed with the receiving nurse.    Opportunity for questions and clarification was provided.      Assessment to be completed upon patient's arrival to unit and care assumed.

## 2025-04-11 NOTE — PROGRESS NOTES
TRANSFER - OUT REPORT:    Verbal report given to ana irby on Bulmaro Borja  being transferred to [facility] for routine post-op       Report consisted of patient's Situation, Background, Assessment and   Recommendations(SBAR).     Information from the following report(s) Nurse Handoff Report was reviewed with the receiving nurse.           Lines:   Peripheral IV 04/10/25 Left Antecubital (Active)   Site Assessment Clean, dry & intact 04/11/25 0712   Line Status Infusing 04/11/25 0712   Line Care Connections checked and tightened;Cap changed 04/11/25 0712   Phlebitis Assessment No symptoms 04/11/25 0712   Infiltration Assessment 0 04/11/25 0712   Alcohol Cap Used Yes 04/11/25 0712   Dressing Status Clean, dry & intact 04/11/25 0712   Dressing Type Transparent 04/11/25 0712       Peripheral IV 04/11/25 Right;Ventral Forearm (Active)   Site Assessment Clean, dry & intact 04/11/25 0712   Line Status Infusing 04/11/25 0712   Line Care Cap changed;Connections checked and tightened 04/11/25 0712   Phlebitis Assessment No symptoms 04/11/25 0712   Infiltration Assessment 0 04/11/25 0712   Alcohol Cap Used Yes 04/11/25 0712   Dressing Status Clean, dry & intact 04/11/25 0712   Dressing Type Transparent 04/11/25 0712        Opportunity for questions and clarification was provided.      Patient transported with:  Tech

## 2025-04-11 NOTE — PERIOP NOTE
TRANSFER - IN REPORT:    Verbal report received from USHA Ybarra on Bulmaro Borja  being received from Med-Surg for ordered procedure      Report consisted of patient's Situation, Background, Assessment and   Recommendations(SBAR).     Information from the following report(s) Nurse Handoff Report was reviewed with the receiving nurse.    Opportunity for questions and clarification was provided.      Assessment completed upon patient's arrival to unit and care assumed.

## 2025-04-11 NOTE — PROGRESS NOTES
TRANSFER - OUT REPORT:    Verbal report given to USHA Lamb on Bulmaro Borja  being transferred to [facility] Pre[facility] for routine progression of patient care       Report consisted of patient's Situation, Background, Assessment and   Recommendations(SBAR).     Information from the following report(s) Nurse Handoff Report, Adult Overview, MAR, Recent Results, Quality Measures, Neuro Assessment, and Event Log was reviewed with the receiving nurse.           Lines:   Peripheral IV 04/10/25 Left Antecubital (Active)   Site Assessment Clean, dry & intact 04/11/25 0712   Line Status Infusing 04/11/25 0712   Line Care Connections checked and tightened;Cap changed 04/11/25 0712   Phlebitis Assessment No symptoms 04/11/25 0712   Infiltration Assessment 0 04/11/25 0712   Alcohol Cap Used Yes 04/11/25 0712   Dressing Status Clean, dry & intact 04/11/25 0712   Dressing Type Transparent 04/11/25 0712       Peripheral IV 04/11/25 Right;Ventral Forearm (Active)   Site Assessment Clean, dry & intact 04/11/25 0712   Line Status Infusing 04/11/25 0712   Line Care Cap changed;Connections checked and tightened 04/11/25 0712   Phlebitis Assessment No symptoms 04/11/25 0712   Infiltration Assessment 0 04/11/25 0712   Alcohol Cap Used Yes 04/11/25 0712   Dressing Status Clean, dry & intact 04/11/25 0712   Dressing Type Transparent 04/11/25 0712        Opportunity for questions and clarification was provided.

## 2025-04-11 NOTE — PLAN OF CARE
Problem: Discharge Planning  Goal: Discharge to home or other facility with appropriate resources  Outcome: Progressing     Problem: Hematologic - Adult  Goal: Maintains hematologic stability  Outcome: Progressing     Problem: Safety - Adult  Goal: Free from fall injury  Outcome: Progressing     Problem: Pain  Goal: Verbalizes/displays adequate comfort level or baseline comfort level  Outcome: Adequate for Discharge

## 2025-04-11 NOTE — PLAN OF CARE
Problem: Pain  Goal: Verbalizes/displays adequate comfort level or baseline comfort level  Outcome: Progressing     Problem: Discharge Planning  Goal: Discharge to home or other facility with appropriate resources  Outcome: Progressing     Problem: Hematologic - Adult  Goal: Maintains hematologic stability  Outcome: Progressing

## 2025-04-11 NOTE — PROGRESS NOTES
Hospitalist Progress Note   Admit Date:  4/10/2025 10:43 AM   Name:  Bulmaro Borja   Age:  44 y.o.  Sex:  male  :  1980   MRN:  [id number]   Room:  [facility]    Presenting/Chief Complaint: Melena     Reason(s) for Admission: Acute GI bleeding [K92.2]     Hospital Course:   Bulmaro Borja is a 44 y.o. male with medical history of non-obstructing nephrolithiasis, right renal cyst, GIB, lower back pain who presented with hematochezia.  Patient reported to [facility] on morning of admission with intermittent melanotic stooling past 2-4 days.  He also admitted to syncope on morning of admission during BM.  He has had a hx of GIB, established with [facility] and last seen by GI in office 2025.  Per GI notes, patient was admitted 2025 for hematemesis. S/p unremarkable EGD and as hgb stabilized ~8, patient was discharged home. Patient also found with iron deficiency anemia and oral iron ordered by GI on 2025.  He was scheduled for outpatient C-scope sometime next month.       Of note, patient with known left kidney stone being managed [facility] and last seen by NP Kamini Hendrickson on 3/20/2025.  Per [facility] notes, he had a CT renal stone protocol 3/19/2025 which revealed a 5mm left renal midpole stone (non-obstructive) and a probable right kidney cyst measuring 12mm.  Urology did not believe his ongoing lower back pain was due to the kidney stone but did prescribe patient mobic and flexeril for pain mgmt, along with general dietary stone prevention guidelines. He was to f/u with a 1 year renal US and KUB for surveillance.       In ER, VSS with T97.5, HR 88, /88, RR 15, 100% RA.  Labwork remarkable for CBC with WBC 11.5, hgb 7.8 (last hgb 10.6 on 2025), MCV 76.7.  CT A/P GIB protocol today revealed no active bleeding with liver appearing normal.  Of note, pt with 11mm right renal cyst and 8mm non-obstructing left renal stone.  GI

## 2025-04-11 NOTE — ANESTHESIA PRE PROCEDURE
CALCIUM 8.5 04/11/2025 03:55 AM    BILITOT <0.2 04/10/2025 10:26 AM    ALKPHOS 89 04/10/2025 10:26 AM    ALKPHOS 54 02/15/2022 06:50 AM    AST 19 04/10/2025 10:26 AM    ALT 25 04/10/2025 10:26 AM       POC Tests: No results for input(s): \"POCGLU\", \"POCNA\", \"POCK\", \"POCCL\", \"POCBUN\", \"POCHEMO\", \"POCHCT\" in the last 72 hours.    Coags:   Lab Results   Component Value Date/Time    PROTIME 13.6 01/23/2025 01:44 PM    INR 1.0 01/23/2025 01:44 PM       HCG (If Applicable): No results found for: \"PREGTESTUR\", \"PREGSERUM\", \"HCG\", \"HCGQUANT\"     ABGs: No results found for: \"PHART\", \"PO2ART\", \"YXH1GYS\", \"FYN7LEZ\", \"BEART\", \"W0GCHWWM\"     Type & Screen (If Applicable):  Lab Results   Component Value Date    ABORH A POSITIVE 04/10/2025    LABANTI NEG 04/10/2025       Drug/Infectious Status (If Applicable):  No results found for: \"HIV\", \"HEPCAB\"    COVID-19 Screening (If Applicable):   Lab Results   Component Value Date/Time    COVID19 Not detected 02/15/2022 09:42 AM           Anesthesia Evaluation  Patient summary reviewed  Airway: Mallampati: II          Dental: normal exam         Pulmonary:Negative Pulmonary ROS breath sounds clear to auscultation                             Cardiovascular:  Exercise tolerance: good (>4 METS)          Rhythm: regular  Rate: normal                    Neuro/Psych:   Negative Neuro/Psych ROS              GI/Hepatic/Renal: Neg GI/Hepatic/Renal ROS            Endo/Other:    (+) blood dyscrasia (Hgb 8.7): anemia:..                 Abdominal:             Vascular: negative vascular ROS.         Other Findings:           Anesthesia Plan      TIVA     ASA 3       Induction: intravenous.      Anesthetic plan and risks discussed with patient.    Use of blood products discussed with patient whom consented to blood products.    Plan discussed with CRNA.                  GINGER CAREY MD   4/11/2025

## 2025-04-12 VITALS
HEART RATE: 56 BPM | BODY MASS INDEX: 36.85 KG/M2 | SYSTOLIC BLOOD PRESSURE: 124 MMHG | RESPIRATION RATE: 17 BRPM | WEIGHT: 187.7 LBS | DIASTOLIC BLOOD PRESSURE: 78 MMHG | HEIGHT: 60 IN | TEMPERATURE: 97.3 F | OXYGEN SATURATION: 98 %

## 2025-04-12 LAB
ABO + RH BLD: NORMAL
ANION GAP SERPL CALC-SCNC: 9 MMOL/L (ref 7–16)
BLD PROD TYP BPU: NORMAL
BLD PROD TYP BPU: NORMAL
BLOOD BANK BLOOD PRODUCT EXPIRATION DATE: NORMAL
BLOOD BANK BLOOD PRODUCT EXPIRATION DATE: NORMAL
BLOOD BANK DISPENSE STATUS: NORMAL
BLOOD BANK DISPENSE STATUS: NORMAL
BLOOD BANK ISBT PRODUCT BLOOD TYPE: 6200
BLOOD BANK ISBT PRODUCT BLOOD TYPE: 6200
BLOOD BANK PRODUCT CODE: NORMAL
BLOOD BANK PRODUCT CODE: NORMAL
BLOOD BANK UNIT TYPE AND RH: NORMAL
BLOOD BANK UNIT TYPE AND RH: NORMAL
BLOOD GROUP ANTIBODIES SERPL: NORMAL
BPU ID: NORMAL
BPU ID: NORMAL
BUN SERPL-MCNC: 9 MG/DL (ref 6–23)
CALCIUM SERPL-MCNC: 8.9 MG/DL (ref 8.8–10.2)
CHLORIDE SERPL-SCNC: 106 MMOL/L (ref 98–107)
CO2 SERPL-SCNC: 27 MMOL/L (ref 20–29)
CREAT SERPL-MCNC: 0.95 MG/DL (ref 0.8–1.3)
CROSSMATCH RESULT: NORMAL
CROSSMATCH RESULT: NORMAL
ERYTHROCYTE [DISTWIDTH] IN BLOOD BY AUTOMATED COUNT: 18.6 % (ref 11.9–14.6)
GLUCOSE SERPL-MCNC: 100 MG/DL (ref 70–99)
HCT VFR BLD AUTO: 26.8 % (ref 41.1–50.3)
HCT VFR BLD AUTO: 29.5 % (ref 41.1–50.3)
HGB BLD-MCNC: 8.5 G/DL (ref 13.6–17.2)
HGB BLD-MCNC: 9.2 G/DL (ref 13.6–17.2)
MAGNESIUM SERPL-MCNC: 2.2 MG/DL (ref 1.8–2.4)
MCH RBC QN AUTO: 24.5 PG (ref 26.1–32.9)
MCHC RBC AUTO-ENTMCNC: 31.2 G/DL (ref 31.4–35)
MCV RBC AUTO: 78.7 FL (ref 82–102)
NRBC # BLD: 0 K/UL (ref 0–0.2)
PLATELET # BLD AUTO: 300 K/UL (ref 150–450)
PMV BLD AUTO: 8.6 FL (ref 9.4–12.3)
POTASSIUM SERPL-SCNC: 3.8 MMOL/L (ref 3.5–5.1)
RBC # BLD AUTO: 3.75 M/UL (ref 4.23–5.6)
SODIUM SERPL-SCNC: 142 MMOL/L (ref 136–145)
SPECIMEN EXP DATE BLD: NORMAL
UNIT DIVISION: 0
UNIT DIVISION: 0
UNIT ISSUE DATE/TIME: NORMAL
UNIT ISSUE DATE/TIME: NORMAL
WBC # BLD AUTO: 6.8 K/UL (ref 4.3–11.1)

## 2025-04-12 PROCEDURE — 85018 HEMOGLOBIN: CPT

## 2025-04-12 PROCEDURE — 99232 SBSQ HOSP IP/OBS MODERATE 35: CPT | Performed by: INTERNAL MEDICINE

## 2025-04-12 PROCEDURE — 85027 COMPLETE CBC AUTOMATED: CPT

## 2025-04-12 PROCEDURE — 36415 COLL VENOUS BLD VENIPUNCTURE: CPT

## 2025-04-12 PROCEDURE — 80048 BASIC METABOLIC PNL TOTAL CA: CPT

## 2025-04-12 PROCEDURE — 85014 HEMATOCRIT: CPT

## 2025-04-12 PROCEDURE — 83735 ASSAY OF MAGNESIUM: CPT

## 2025-04-12 RX ORDER — PANTOPRAZOLE SODIUM 40 MG/1
40 TABLET, DELAYED RELEASE ORAL
Qty: 60 TABLET | Refills: 0 | Status: SHIPPED | OUTPATIENT
Start: 2025-04-12

## 2025-04-12 ASSESSMENT — ENCOUNTER SYMPTOMS
DIARRHEA: 0
COLOR CHANGE: 0
SHORTNESS OF BREATH: 0
TROUBLE SWALLOWING: 0
ABDOMINAL PAIN: 0
CONSTIPATION: 0
NAUSEA: 0
BLOOD IN STOOL: 0
VOMITING: 0
ABDOMINAL DISTENTION: 0

## 2025-04-12 NOTE — PROGRESS NOTES
3 mg  3 mg Oral Nightly Jackie May LUIS MICHEL   3 mg at 04/10/25 2059    pantoprazole (PROTONIX) 40 mg in sodium chloride (PF) 0.9 % 10 mL injection  40 mg IntraVENous Q12H Jackie May LUIS MICHEL        bisacodyl (DULCOLAX) EC tablet 15 mg  15 mg Oral Once Moreno Brooks MD        lactated ringers infusion   IntraVENous Continuous Jackie May Y,  mL/hr at 04/11/25 2246 New Bag at 04/11/25 2246    HYDROcodone-acetaminophen (NORCO) 5-325 MG per tablet 1 tablet  1 tablet Oral Q6H PRN Jackie May Y, PA        ferric gluconate (FERRLECIT) 125 mg in sodium chloride 0.9 % 100 mL IVPB  125 mg IntraVENous Q24H Jackie May Y, PA   Stopped at 04/11/25 1808       ALLERGIES:    No Known Allergies    ROS:     Review of Systems   Constitutional:  Negative for activity change and fatigue.   HENT:  Negative for trouble swallowing.    Respiratory:  Negative for shortness of breath.    Cardiovascular:  Negative for chest pain.   Gastrointestinal:  Negative for abdominal distention, abdominal pain, blood in stool, constipation, diarrhea, nausea and vomiting.   Skin:  Negative for color change.   Neurological:  Negative for weakness.   Psychiatric/Behavioral:  Negative for confusion.         PHYSICAL EXAMINATION    /78   Pulse 56   Temp 97.3 °F (36.3 °C)   Resp 17   Ht 1.524 m (5')   Wt 85.1 kg (187 lb 11.2 oz)   SpO2 98%   BMI 36.66 kg/m²     Physical Exam  Constitutional:       Appearance: Normal appearance.   HENT:      Head: Normocephalic and atraumatic.      Nose: Nose normal.   Eyes:      General: No scleral icterus.  Pulmonary:      Effort: No respiratory distress.   Abdominal:      General: There is no distension.   Skin:     Coloration: Skin is not jaundiced.   Neurological:      Mental Status: He is alert and oriented to person, place, and time.   Psychiatric:         Behavior: Behavior normal.         Subjective:   was used for encounter.  He reports no further sign of GI bleeding.    Jose Miguel Singh  Michelle, DO

## 2025-04-12 NOTE — PLAN OF CARE
Problem: Pain  Goal: Verbalizes/displays adequate comfort level or baseline comfort level  4/12/2025 1021 by Tate Marcelo RN  Outcome: Adequate for Discharge  4/11/2025 2317 by Delmi Dunlap RN  Outcome: Progressing     Problem: Discharge Planning  Goal: Discharge to home or other facility with appropriate resources  4/12/2025 1021 by Tate Marcelo RN  Outcome: Adequate for Discharge  4/11/2025 2317 by Delmi Dunlap RN  Outcome: Progressing     Problem: Hematologic - Adult  Goal: Maintains hematologic stability  4/12/2025 1021 by Tate Marcelo RN  Outcome: Adequate for Discharge  4/11/2025 2317 by Delmi Dunlap RN  Outcome: Progressing     Problem: Safety - Adult  Goal: Free from fall injury  4/12/2025 1021 by Tate Marcelo RN  Outcome: Adequate for Discharge  4/11/2025 2317 by Delmi Dunlap RN  Outcome: Progressing

## 2025-04-12 NOTE — DISCHARGE SUMMARY
Hospitalist Discharge Summary   Admit Date:  4/10/2025 10:43 AM   DC Note date: 2025  Name:  Bulmaro Borja   Age:  44 y.o.  Sex:  male  :  1980   MRN:  [id number]   Room:  [facility]  PCP:  Armando Davey APRN - NP    Presenting Complaint: Melena     Initial Admission Diagnosis: Acute GI bleeding [K92.2]     Problem List for this Hospitalization (present on admission):    Principal Problem:    GI bleed  Active Problems:    Anemia    Melena    Acute GI bleeding  Resolved Problems:    * No resolved hospital problems. *      Hospital Course:  Bulmaro Borja is a 44 y.o. male with medical history of non-obstructing nephrolithiasis, right renal cyst, GIB, lower back pain who presented with hematochezia.  Patient reported to [facility] on morning of admission with intermittent melanotic stooling past 2-4 days.  He also admitted to syncope on morning of admission during BM.  He has had a hx of GIB, established with [facility] and last seen by GI in office 2025.  Per GI notes, patient was admitted 2025 for hematemesis. S/p unremarkable EGD and as hgb stabilized ~8, patient was discharged home. Patient also found with iron deficiency anemia and oral iron ordered by GI on 2025.  He was scheduled for outpatient C-scope sometime next month.       Of note, patient with known left kidney stone being managed [facility] and last seen by NP Kamini Hendrickson on 3/20/2025.  Per [facility] notes, he had a CT renal stone protocol 3/19/2025 which revealed a 5mm left renal midpole stone (non-obstructive) and a probable right kidney cyst measuring 12mm.  Urology did not believe his ongoing lower back pain was due to the kidney stone but did prescribe patient mobic and flexeril for pain mgmt, along with general dietary stone prevention guidelines. He was to f/u with a 1 year renal US and KUB for surveillance.       In ER, VSS with T97.5, HR 88, /88, RR 15,  - 29 mmol/L    Anion Gap 9 7 - 16 mmol/L    Glucose 100 (H) 70 - 99 mg/dL    BUN 9 6 - 23 MG/DL    Creatinine 0.95 0.80 - 1.30 MG/DL    Est, Glom Filt Rate >90 >60 ml/min/1.73m2    Calcium 8.9 8.8 - 10.2 MG/DL   Magnesium    Collection Time: 04/12/25  6:48 AM   Result Value Ref Range    Magnesium 2.2 1.8 - 2.4 mg/dL   CBC    Collection Time: 04/12/25  6:48 AM   Result Value Ref Range    WBC 6.8 4.3 - 11.1 K/uL    RBC 3.75 (L) 4.23 - 5.6 M/uL    Hemoglobin 9.2 (L) 13.6 - 17.2 g/dL    Hematocrit 29.5 (L) 41.1 - 50.3 %    MCV 78.7 (L) 82.0 - 102.0 FL    MCH 24.5 (L) 26.1 - 32.9 PG    MCHC 31.2 (L) 31.4 - 35.0 g/dL    RDW 18.6 (H) 11.9 - 14.6 %    Platelets 300 150 - 450 K/uL    MPV 8.6 (L) 9.4 - 12.3 FL    nRBC 0.00 0.0 - 0.2 K/uL       No results for input(s): \"COVID19\" in the last 72 hours.    Recent Vital Data:  Patient Vitals for the past 24 hrs:   Temp Pulse Resp BP SpO2   04/12/25 0704 97.3 °F (36.3 °C) 56 17 124/78 98 %   04/12/25 0543 -- 60 -- -- --   04/12/25 0319 97.9 °F (36.6 °C) 58 16 122/74 99 %   04/11/25 2340 97.9 °F (36.6 °C) 62 16 125/78 99 %   04/11/25 1935 98.4 °F (36.9 °C) 71 17 125/71 100 %   04/11/25 1550 97.7 °F (36.5 °C) 67 19 118/76 99 %   04/11/25 1538 -- 68 20 119/77 100 %   04/11/25 1533 -- 69 26 117/74 100 %   04/11/25 1523 -- 70 27 108/68 100 %   04/11/25 1522 -- 70 26 107/70 100 %   04/11/25 1513 -- 78 20 (!) 97/52 100 %   04/11/25 1449 -- 74 14 (!) 144/76 100 %   04/11/25 1219 98.6 °F (37 °C) 73 14 125/82 100 %   04/11/25 1101 99.1 °F (37.3 °C) 70 17 127/84 100 %   04/11/25 1007 98.1 °F (36.7 °C) 73 14 127/82 99 %       Oxygen Therapy  SpO2: 98 %  Pulse Oximetry Type: Intermittent  Pulse via Oximetry: 78 beats per minute  Pulse Oximeter Device Mode: Continuous  Pulse Oximeter Device Location: Finger  O2 Device: None (Room air)  O2 Flow Rate (L/min): 2 L/min    Estimated body mass index is 36.66 kg/m² as calculated from the following:    Height as of this encounter: 1.524 m (5').

## 2025-04-12 NOTE — CARE COORDINATION
Patient discharged today to home.  Referral made to [facility] requesting to get reestablished. I also made a referral to our Community [facility] Dept / Delmis Stevens RN  who are bilingual and can assist him with obtaining PCP and f/u care as he has no ins and no income. Pt was happy to have any assistance.

## 2025-04-12 NOTE — PLAN OF CARE
Problem: Pain  Goal: Verbalizes/displays adequate comfort level or baseline comfort level  4/11/2025 2317 by Delmi Dunlap RN  Outcome: Progressing  4/11/2025 1401 by Tate Marcelo RN  Outcome: Adequate for Discharge     Problem: Discharge Planning  Goal: Discharge to home or other facility with appropriate resources  4/11/2025 2317 by Delmi Dunlap RN  Outcome: Progressing  4/11/2025 1401 by Tate Marcelo RN  Outcome: Progressing     Problem: Hematologic - Adult  Goal: Maintains hematologic stability  4/11/2025 2317 by Delmi Dunlap RN  Outcome: Progressing  4/11/2025 1401 by Tate Marcelo RN  Outcome: Progressing     Problem: Safety - Adult  Goal: Free from fall injury  4/11/2025 2317 by Delmi Dunlap RN  Outcome: Progressing  4/11/2025 1401 by Tate Marcelo RN  Outcome: Progressing

## 2025-04-15 ENCOUNTER — TELEPHONE (OUTPATIENT)
Dept: GASTROENTEROLOGY | Age: 45
End: 2025-04-15

## 2025-04-15 NOTE — TELEPHONE ENCOUNTER
Called patient with  (Mago # [id number]) with recommendations per Dr Martinez:    \"Please set this patient up for a video capsule endoscopy, diagnosis anemia and melena.  Negative EGD and colonoscopy.  Please put this on Dr. Brooks's schedule for next week.\"    Discussed details/ instructions (see below) for video capsule endoscopy. Patient verbalized agreement/ understanding and wishes to proceed with scheduling. Appointment made for 4/23 @ 8:00 for video capsule endoscopy with Dr Easton. Will change provider to Dr Brooks after time slot has opened up for Dr Brooks. (Message sent to  with request). Have sent message to TEOFILO Danielson to reserve  for appointment.     The day before your procedure you will be able to eat until after lunch. Then you will need to have only clear, odorless liquids until 10:00 pm. You should avoid taking medications that are not necessary 10 hours prior to procedure. You may take necessary medications 2 hours prior to procedure with a sip of water. Arrive to office no later than 8:15 am the day of your pillcam study. You will need to remain in the upright position for study. You will return to office to drop off equipment by 4:15 pm.

## 2025-04-18 ENCOUNTER — HOSPITAL ENCOUNTER (OUTPATIENT)
Dept: GENERAL RADIOLOGY | Age: 45
Discharge: HOME | End: 2025-04-20

## 2025-04-18 ENCOUNTER — OFFICE VISIT (OUTPATIENT)
Dept: PRIMARY CARE CLINIC | Facility: CLINIC | Age: 45
End: 2025-04-18

## 2025-04-18 VITALS
BODY MASS INDEX: 28.34 KG/M2 | HEART RATE: 84 BPM | OXYGEN SATURATION: 99 % | HEIGHT: 68 IN | RESPIRATION RATE: 18 BRPM | SYSTOLIC BLOOD PRESSURE: 133 MMHG | DIASTOLIC BLOOD PRESSURE: 75 MMHG | TEMPERATURE: 98.3 F | WEIGHT: 187 LBS

## 2025-04-18 DIAGNOSIS — G89.29 CHRONIC MIDLINE LOW BACK PAIN WITHOUT SCIATICA: ICD-10-CM

## 2025-04-18 DIAGNOSIS — Z09 HOSPITAL DISCHARGE FOLLOW-UP: Primary | ICD-10-CM

## 2025-04-18 DIAGNOSIS — N20.0 NEPHROLITHIASIS: ICD-10-CM

## 2025-04-18 DIAGNOSIS — M54.50 CHRONIC MIDLINE LOW BACK PAIN WITHOUT SCIATICA: ICD-10-CM

## 2025-04-18 DIAGNOSIS — K29.01 GASTROINTESTINAL HEMORRHAGE ASSOCIATED WITH ACUTE GASTRITIS: ICD-10-CM

## 2025-04-18 PROCEDURE — 72110 X-RAY EXAM L-2 SPINE 4/>VWS: CPT

## 2025-04-18 PROCEDURE — 99204 OFFICE O/P NEW MOD 45 MIN: CPT | Performed by: FAMILY MEDICINE

## 2025-04-18 RX ORDER — CYCLOBENZAPRINE HCL 10 MG
10 TABLET ORAL 3 TIMES DAILY PRN
Qty: 30 TABLET | Refills: 3 | Status: SHIPPED | OUTPATIENT
Start: 2025-04-18 | End: 2025-04-30

## 2025-04-18 SDOH — ECONOMIC STABILITY: FOOD INSECURITY: WITHIN THE PAST 12 MONTHS, THE FOOD YOU BOUGHT JUST DIDN'T LAST AND YOU DIDN'T HAVE MONEY TO GET MORE.: NEVER TRUE

## 2025-04-18 SDOH — ECONOMIC STABILITY: FOOD INSECURITY: WITHIN THE PAST 12 MONTHS, YOU WORRIED THAT YOUR FOOD WOULD RUN OUT BEFORE YOU GOT MONEY TO BUY MORE.: NEVER TRUE

## 2025-04-18 ASSESSMENT — PATIENT HEALTH QUESTIONNAIRE - PHQ9
SUM OF ALL RESPONSES TO PHQ QUESTIONS 1-9: 0
1. LITTLE INTEREST OR PLEASURE IN DOING THINGS: NOT AT ALL
SUM OF ALL RESPONSES TO PHQ QUESTIONS 1-9: 0
2. FEELING DOWN, DEPRESSED OR HOPELESS: NOT AT ALL
SUM OF ALL RESPONSES TO PHQ QUESTIONS 1-9: 0
SUM OF ALL RESPONSES TO PHQ QUESTIONS 1-9: 0

## 2025-04-18 NOTE — PATIENT INSTRUCTIONS
Servicios públicos de [place]: recursos financieros*  (Llame a [facility]/211 si necesita más recursos).      Asistencia para servicios públicos  Programa de Asistencia de Energía para Hogares de Bajos Ingresos (Low Income Home Energy Assistance Program, LIHEAP)  Lo que ofrecen: asistencia energética.  Contacto: [phone]; [email or web address]  Información útil: debe ser residente de [facility] y necesitar ayuda financiera con los costos de energía doméstica.    [employer]  Lo que ofrecen: thea amplia dannie de servicios a residentes de ingresos bajos y moderados en el estado de [facility].  Contacto: [phone]; [address] [facility]  [address]  Lo que ofrecen: necesidades básicas de servicios de estabilidad y apoyo.  Contacto: [phone]; [address] [place] [address].  [facility]  Lo que ofrecen: ayuda a las familias e individuos que necesitan pagar alquiler y facturas de servicios públicos, comprar ropa y alimentos.  Contacto: [phone]; [address] [place] [address] [facility]  Ofrece: asistencia con facturas de servicios públicos.  Contacto: [phone]; [address]  Lo que ofrecen: asistencia con el pago de facturas, el alquiler, ropa y alimentos. Cuentan con servicios de asesoramiento y gestión de casos.  Contacto: [phone] [place] [address]  Asistencia financiera/médica  Asistencia financiera de [facility]  Lo que ofrecen: ayuda a los pacientes sin seguro que no reúnen los requisitos para el seguro médico patrocinado por el gobierno y no pueden pagar [place]ford atención médica. Los pacientes con seguro también pueden reunir los requisitos dependiendo de los ingresos familiares, el tamaño de la sumeet y las necesidades médicas.  Contacto: [phone]  Información útil: cómo

## 2025-04-18 NOTE — PROGRESS NOTES
tablet 1     No current facility-administered medications for this visit.     No Known Allergies    Objective:   /75 (BP Site: Right Upper Arm, Patient Position: Sitting, BP Cuff Size: Medium Adult)   Pulse 84   Temp 98.3 °F (36.8 °C)   Resp 18   Ht 1.72 m (5' 7.72\")   Wt 84.8 kg (187 lb)   SpO2 99%   BMI 28.67 kg/m²     Physical Exam  Vitals and nursing note reviewed.   Constitutional:       General: He is not in acute distress.     Appearance: Normal appearance. He is normal weight. He is not ill-appearing, toxic-appearing or diaphoretic.   HENT:      Head: Normocephalic.      Nose: Nose normal.   Eyes:      Extraocular Movements: Extraocular movements intact.   Cardiovascular:      Rate and Rhythm: Normal rate and regular rhythm.      Heart sounds: No murmur heard.  Pulmonary:      Effort: Pulmonary effort is normal.      Breath sounds: Normal breath sounds.   Abdominal:      General: There is no distension.      Tenderness: There is no abdominal tenderness.   Musculoskeletal:         General: No deformity.      Thoracic back: Normal.      Lumbar back: Spasms and tenderness present. No bony tenderness. Normal range of motion.        Back:    Skin:     General: Skin is warm.   Neurological:      General: No focal deficit present.      Mental Status: He is alert.   Psychiatric:         Mood and Affect: Mood normal.         Assessment and Plan:      Diagnosis Orders   1. Hospital discharge follow-up        2. Chronic midline low back pain without sciatica  cyclobenzaprine (FLEXERIL) 10 MG tablet    XR LUMBAR SPINE (MIN 4 VIEWS)    [facility]      3. Gastrointestinal hemorrhage associated with acute gastritis        4. Nephrolithiasis          New patient to the practice.  Data reviewed:  Previous notes, labs and Specialists notes, if any, reviewed and discussed with patient.  Here for hospital discharge follow up. Doing better.   LBP - chronic, check xray, refer to ortho

## 2025-04-21 ENCOUNTER — TELEPHONE (OUTPATIENT)
Dept: GASTROENTEROLOGY | Age: 45
End: 2025-04-21

## 2025-04-21 NOTE — TELEPHONE ENCOUNTER
Attempted to call patient using , no answer and VM not set up. Patient is scheduled for pill cam and price would be 935.40. Called to see if patient would like to proceed or cancel. Patient has already applied for financial assistance.

## 2025-04-22 ENCOUNTER — OFFICE VISIT (OUTPATIENT)
Age: 45
End: 2025-04-22

## 2025-04-22 VITALS — BODY MASS INDEX: 29.38 KG/M2 | HEIGHT: 67 IN | WEIGHT: 187.2 LBS

## 2025-04-22 DIAGNOSIS — M51.360 DEGENERATION OF INTERVERTEBRAL DISC OF LUMBAR REGION WITH DISCOGENIC BACK PAIN: ICD-10-CM

## 2025-04-22 DIAGNOSIS — M43.16 SPONDYLOLISTHESIS OF LUMBAR REGION: Primary | ICD-10-CM

## 2025-04-22 PROCEDURE — 99204 OFFICE O/P NEW MOD 45 MIN: CPT | Performed by: NURSE PRACTITIONER

## 2025-04-22 RX ORDER — METHOCARBAMOL 750 MG/1
750 TABLET, FILM COATED ORAL 3 TIMES DAILY PRN
Qty: 30 TABLET | Refills: 0 | Status: SHIPPED | OUTPATIENT
Start: 2025-04-22 | End: 2025-05-02

## 2025-04-22 RX ORDER — LIDOCAINE 4 G/G
1 PATCH TOPICAL DAILY
Qty: 30 PATCH | Refills: 0 | Status: SHIPPED | OUTPATIENT
Start: 2025-04-22 | End: 2025-05-22

## 2025-04-22 NOTE — PROGRESS NOTES
Name: Bulmaro Borja  YOB: 1980  Gender: male  MRN: [id number]    CC: Back pain    HPI: This is a 44 y.o. year old male who is predominantly [place]an-speaking and  was provided at today's appointment.  Patient has had low back pain for quite some time.  He fell off a ladder almost 3 years ago.  Pain is gotten more intense over the past 6 months.  He thought it was a kidney stone however went to urology and was found to have a nonobstructing kidney stone.  He was given diclofenac which ended up giving him a GI bleed.  He currently is taking Flexeril as needed.  He is very active.  He works construction.  He does run for exercise as well.  But states his pain can be intermittent.  It is in the lower back at L5-S1 and also in the mid lumbar spine at approximately L2-3.  Recent x-rays of lumbar spine reveal lower lumbar spondylosis.  Degenerative disc disease at L4-5, L5-S1 that is mild in nature, loss of lumbar lordosis.  Radiologist mentions a spondylolisthesis of L2 on L3 but this is actually a retrolisthesis of L2 on L3      This patient  has not had lumbar surgery in the past.     Thus far, the patient has tried : Physical therapy which was not beneficial, chiropractic care, Flexeril  Current pain level: 2-6  Activities limited by pain: Depends upon the activity           4/22/2025     9:28 AM   AMB PAIN ASSESSMENT   Location of Pain Back   Severity of Pain 2   Limiting Behavior Yes   Result of Injury No   Work-Related Injury No            ROS/Meds/PSH/PMH/FH/SH: I personally reviewed the patient's collected intake data.  Below are the pertinents:    No Known Allergies      Current Outpatient Medications:     lidocaine 4 % external patch, Place 1 patch onto the skin daily, Disp: 30 patch, Rfl: 0    methocarbamol (ROBAXIN-750) 750 MG tablet, Take 1 tablet by mouth 3 times daily as needed (for muscle spasm), Disp: 30 tablet, Rfl: 0    cyclobenzaprine (FLEXERIL) 10 MG

## 2025-04-23 ENCOUNTER — OFFICE VISIT (OUTPATIENT)
Dept: GASTROENTEROLOGY | Age: 45
End: 2025-04-23

## 2025-04-23 DIAGNOSIS — D64.9 ANEMIA, UNSPECIFIED TYPE: ICD-10-CM

## 2025-04-23 DIAGNOSIS — R58 BLEEDING: Primary | ICD-10-CM

## 2025-04-23 NOTE — PROGRESS NOTES
Patient arrived as instructed for pillcam study. He verbalized adherence to clear, odorless liquid diet (no milk/ dairy, alcohol) since after lunchtime yesterday/ NPO since 10:00 pm last night. Only necessary medications taken 10 hours prior to procedure with a sip of water. No medications taken 2 hours prior to swallowing pillcam. He arrived wearing thin, natural fiber upper garment long enough to reach at least to the hip level and not ride up above the belt. Sensor belt placed on patient over a single layer of clothing. Placed the shoulder strap across the left shoulder. Adjusted shoulder and belt straps to fit. Sensor connected to the recorder 3 and placed into pouch. Pillcam SB3 capsule box opened and paired with recorder. Touching the non clear part of the pillcam, patient removed the pillcam and swallowed capsule with a sip of water. Patient instructed that he may drink clear liquids 2 hours after capsule is ingested and may have a light snack 4 hours after capsule is ingested. Patient advised to remain in the upright position at all times. Instructed to return to office today by 4:45 pm to return equipment.      1630  Patient returned VCE equipment. Recorder placed into docking station for downloading.

## 2025-04-25 ENCOUNTER — HOSPITAL ENCOUNTER (OUTPATIENT)
Dept: LAB | Age: 45
Discharge: HOME | End: 2025-04-25

## 2025-04-25 ENCOUNTER — OFFICE VISIT (OUTPATIENT)
Dept: ONCOLOGY | Age: 45
End: 2025-04-25

## 2025-04-25 VITALS
TEMPERATURE: 99.5 F | OXYGEN SATURATION: 100 % | WEIGHT: 188.3 LBS | DIASTOLIC BLOOD PRESSURE: 80 MMHG | HEART RATE: 73 BPM | BODY MASS INDEX: 29.55 KG/M2 | HEIGHT: 67 IN | SYSTOLIC BLOOD PRESSURE: 145 MMHG | RESPIRATION RATE: 16 BRPM

## 2025-04-25 DIAGNOSIS — K29.71 GASTROINTESTINAL HEMORRHAGE ASSOCIATED WITH GASTRITIS, UNSPECIFIED GASTRITIS TYPE: ICD-10-CM

## 2025-04-25 DIAGNOSIS — D50.0 IRON DEFICIENCY ANEMIA DUE TO CHRONIC BLOOD LOSS: Primary | ICD-10-CM

## 2025-04-25 DIAGNOSIS — D50.0 IRON DEFICIENCY ANEMIA DUE TO CHRONIC BLOOD LOSS: ICD-10-CM

## 2025-04-25 DIAGNOSIS — K29.71 GASTROINTESTINAL HEMORRHAGE ASSOCIATED WITH GASTRITIS, UNSPECIFIED GASTRITIS TYPE: Primary | ICD-10-CM

## 2025-04-25 LAB
ALBUMIN SERPL-MCNC: 4.1 G/DL (ref 3.5–5)
ALBUMIN/GLOB SERPL: 1.2 (ref 1–1.9)
ALP SERPL-CCNC: 92 U/L (ref 40–129)
ALT SERPL-CCNC: 30 U/L (ref 8–55)
ANION GAP SERPL CALC-SCNC: 13 MMOL/L (ref 7–16)
AST SERPL-CCNC: 25 U/L (ref 15–37)
BASOPHILS # BLD: 0.04 K/UL (ref 0–0.2)
BASOPHILS NFR BLD: 0.6 % (ref 0–2)
BILIRUB SERPL-MCNC: <0.2 MG/DL (ref 0–1.2)
BUN SERPL-MCNC: 11 MG/DL (ref 6–23)
CALCIUM SERPL-MCNC: 9.6 MG/DL (ref 8.8–10.2)
CHLORIDE SERPL-SCNC: 101 MMOL/L (ref 98–107)
CO2 SERPL-SCNC: 26 MMOL/L (ref 20–29)
CREAT SERPL-MCNC: 0.94 MG/DL (ref 0.8–1.3)
DIFFERENTIAL METHOD BLD: ABNORMAL
EOSINOPHIL # BLD: 0.04 K/UL (ref 0–0.8)
EOSINOPHIL NFR BLD: 0.6 % (ref 0.5–7.8)
ERYTHROCYTE [DISTWIDTH] IN BLOOD BY AUTOMATED COUNT: 18.7 % (ref 11.9–14.6)
FERRITIN SERPL-MCNC: 67 NG/ML (ref 8–388)
FOLATE SERPL-MCNC: 10.8 NG/ML (ref 3.1–17.5)
GLOBULIN SER CALC-MCNC: 3.4 G/DL (ref 2.3–3.5)
GLUCOSE SERPL-MCNC: 99 MG/DL (ref 70–99)
HCT VFR BLD AUTO: 36.1 % (ref 41.1–50.3)
HGB BLD-MCNC: 10.9 G/DL (ref 13.6–17.2)
IMM GRANULOCYTES # BLD AUTO: 0.04 K/UL (ref 0–0.5)
IMM GRANULOCYTES NFR BLD AUTO: 0.6 % (ref 0–5)
IRON SATN MFR SERPL: 31 % (ref 20–50)
IRON SERPL-MCNC: 120 UG/DL (ref 35–100)
LYMPHOCYTES # BLD: 1.34 K/UL (ref 0.5–4.6)
LYMPHOCYTES NFR BLD: 19.8 % (ref 13–44)
MCH RBC QN AUTO: 24.5 PG (ref 26.1–32.9)
MCHC RBC AUTO-ENTMCNC: 30.2 G/DL (ref 31.4–35)
MCV RBC AUTO: 81.1 FL (ref 82–102)
MONOCYTES # BLD: 0.68 K/UL (ref 0.1–1.3)
MONOCYTES NFR BLD: 10.1 % (ref 4–12)
NEUTS SEG # BLD: 4.62 K/UL (ref 1.7–8.2)
NEUTS SEG NFR BLD: 68.3 % (ref 43–78)
NRBC # BLD: 0 K/UL (ref 0–0.2)
PLATELET # BLD AUTO: 411 K/UL (ref 150–450)
PMV BLD AUTO: 8.4 FL (ref 9.4–12.3)
POTASSIUM SERPL-SCNC: 3.9 MMOL/L (ref 3.5–5.1)
PROT SERPL-MCNC: 7.5 G/DL (ref 6.3–8.2)
RBC # BLD AUTO: 4.45 M/UL (ref 4.23–5.6)
SODIUM SERPL-SCNC: 140 MMOL/L (ref 136–145)
TIBC SERPL-MCNC: 390 UG/DL (ref 240–450)
UIBC SERPL-MCNC: 270 UG/DL (ref 112–347)
VIT B12 SERPL-MCNC: 521 PG/ML (ref 193–986)
WBC # BLD AUTO: 6.8 K/UL (ref 4.3–11.1)

## 2025-04-25 PROCEDURE — 82746 ASSAY OF FOLIC ACID SERUM: CPT

## 2025-04-25 PROCEDURE — 85025 COMPLETE CBC W/AUTO DIFF WBC: CPT

## 2025-04-25 PROCEDURE — 99204 OFFICE O/P NEW MOD 45 MIN: CPT | Performed by: INTERNAL MEDICINE

## 2025-04-25 PROCEDURE — 82728 ASSAY OF FERRITIN: CPT

## 2025-04-25 PROCEDURE — 83550 IRON BINDING TEST: CPT

## 2025-04-25 PROCEDURE — 80053 COMPREHEN METABOLIC PANEL: CPT

## 2025-04-25 PROCEDURE — 36415 COLL VENOUS BLD VENIPUNCTURE: CPT

## 2025-04-25 PROCEDURE — 83540 ASSAY OF IRON: CPT

## 2025-04-25 PROCEDURE — 82607 VITAMIN B-12: CPT

## 2025-04-25 PROCEDURE — 83921 ORGANIC ACID SINGLE QUANT: CPT

## 2025-04-25 PROCEDURE — 83090 ASSAY OF HOMOCYSTEINE: CPT

## 2025-04-25 RX ORDER — SODIUM CHLORIDE 9 MG/ML
5-250 INJECTION, SOLUTION INTRAVENOUS PRN
OUTPATIENT
Start: 2025-05-16

## 2025-04-25 RX ORDER — ALBUTEROL SULFATE 90 UG/1
4 INHALANT RESPIRATORY (INHALATION) PRN
OUTPATIENT
Start: 2025-05-16

## 2025-04-25 RX ORDER — HYDROCORTISONE SODIUM SUCCINATE 100 MG/2ML
100 INJECTION INTRAMUSCULAR; INTRAVENOUS
OUTPATIENT
Start: 2025-05-16

## 2025-04-25 RX ORDER — SODIUM CHLORIDE 0.9 % (FLUSH) 0.9 %
5-40 SYRINGE (ML) INJECTION PRN
OUTPATIENT
Start: 2025-05-16

## 2025-04-25 RX ORDER — HEPARIN SODIUM (PORCINE) LOCK FLUSH IV SOLN 100 UNIT/ML 100 UNIT/ML
500 SOLUTION INTRAVENOUS PRN
OUTPATIENT
Start: 2025-05-16

## 2025-04-25 RX ORDER — SODIUM CHLORIDE 9 MG/ML
INJECTION, SOLUTION INTRAVENOUS CONTINUOUS
OUTPATIENT
Start: 2025-05-16

## 2025-04-25 RX ORDER — FAMOTIDINE 10 MG/ML
20 INJECTION, SOLUTION INTRAVENOUS
OUTPATIENT
Start: 2025-05-16

## 2025-04-25 RX ORDER — DIPHENHYDRAMINE HYDROCHLORIDE 50 MG/ML
50 INJECTION, SOLUTION INTRAMUSCULAR; INTRAVENOUS
OUTPATIENT
Start: 2025-05-16

## 2025-04-25 RX ORDER — ONDANSETRON 2 MG/ML
8 INJECTION INTRAMUSCULAR; INTRAVENOUS
OUTPATIENT
Start: 2025-05-16

## 2025-04-25 RX ORDER — ACETAMINOPHEN 325 MG/1
650 TABLET ORAL
OUTPATIENT
Start: 2025-05-16

## 2025-04-25 RX ORDER — EPINEPHRINE 1 MG/ML
0.3 INJECTION, SOLUTION, CONCENTRATE INTRAVENOUS PRN
OUTPATIENT
Start: 2025-05-16

## 2025-04-25 ASSESSMENT — PATIENT HEALTH QUESTIONNAIRE - PHQ9
SUM OF ALL RESPONSES TO PHQ QUESTIONS 1-9: 0
SUM OF ALL RESPONSES TO PHQ QUESTIONS 1-9: 0
1. LITTLE INTEREST OR PLEASURE IN DOING THINGS: NOT AT ALL
SUM OF ALL RESPONSES TO PHQ QUESTIONS 1-9: 0
SUM OF ALL RESPONSES TO PHQ QUESTIONS 1-9: 0
2. FEELING DOWN, DEPRESSED OR HOPELESS: NOT AT ALL

## 2025-04-25 NOTE — PROGRESS NOTES
Outpatient Consult Note    Data Source: Patient, [facility] record.    4/25/2025  1:41 PM      Bulmaro Borja [id number]    44 y.o.    Patient Encounter: [facility] Note    Reason for consult:  Anemia      HPI:  44-year-old, , history of nephrolithiasis, GI bleeding, with recent hospitalization for dark stools and coffee-ground emesis.  Patient received PRBC transfusion x 1.  Was seen by GI and underwent thorough GI evaluation including capsule study without obvious cause.  He is now referred to us for further management of his anemia.  Recent blood work with isolated microcytic anemia with hemoglobin 9.2 with high RDW.  Microcytosis is new.  More recent iron panel with significantly low ferritin was 13.     Past Medical History:   Diagnosis Date    Kidney stone          Past Surgical History:   Procedure Laterality Date    COLONOSCOPY N/A 2/16/2022    COLONOSCOPY/BMI 27 Room [facility]/[place]zer[place] performed by Davonte Jaime [place] [facility]    COLONOSCOPY N/A 4/11/2025    COLONOSCOPY DIAGNOSTIC performed by Moreno Brooks MD at [facility]    LITHOTRIPSY      UPPER GASTROINTESTINAL ENDOSCOPY N/A 1/24/2025    ESOPHAGOGASTRODUODENOSCOPY performed by Katherine Short MD at [facility] ENDOSCOPY    UPPER GASTROINTESTINAL ENDOSCOPY N/A 4/10/2025    ESOPHAGOGASTRODUODENOSCOPY performed by Moreno Brooks MD at [facility]         Current Outpatient Medications   Medication Sig Dispense Refill    methocarbamol (ROBAXIN-750) 750 MG tablet Take 1 tablet by mouth 3 times daily as needed (for muscle spasm) 30 tablet 0    pantoprazole (PROTONIX) 40 MG tablet Take 1 tablet by mouth 2 times daily (before meals) 60 tablet 0    ferrous sulfate (IRON 325) 325 (65 Fe) MG tablet Take 1 tablet by mouth daily (with breakfast) 90 tablet 1    lidocaine 4 % external patch Place 1 patch onto the skin daily (Patient not taking: Reported on 4/25/2025) 30 patch 0    cyclobenzaprine (FLEXERIL) 10 MG tablet Take 1

## 2025-04-25 NOTE — PATIENT INSTRUCTIONS
Patient Information from Today's Visit      Diagnosis (Information Sheet Provided on Day of Diagnosis): iron deficiency anemia    Follow Up Instructions:   - lab work today  - we will set you up for two IV iron infusions and try to get financial assistance for the iron infusions  - lab work in about 2-3 months  - follow up with Dr. Laguerre in about 5 months    Has Treatment Plan Been Finalized? N/A     Current Labs:       Please refer to After Visit Summary or [facility] for upcoming appointment information. Please call our office for rescheduling needs at least 24 hours before your scheduled appointment time.If you have any questions regarding your upcoming schedule please reach out to your care team through [facility] [employer] or call [phone].     Please notify your assigned Nurse Navigator of any unplanned hospital admissions or Emergency Room visits within 24 hours of discharge.    -------------------------------------------------------------------------------------------------------------------  Please call our office at [phone] if you have any  of the following symptoms:   Fever of 100.5 or greater  Chills  Shortness of breath  Swelling or pain in one leg    After office hours an answering service is available and will contact a provider for emergencies or if you are experiencing any of the above symptoms.        Rocio Edwards RN

## 2025-04-28 ENCOUNTER — TELEPHONE (OUTPATIENT)
Dept: ONCOLOGY | Age: 45
End: 2025-04-28

## 2025-04-28 LAB — HCYS SERPL-SCNC: 10 UMOL/L (ref 0–14.5)

## 2025-04-28 NOTE — TELEPHONE ENCOUNTER
Labs reviewed by Dr. Laguerre. Hemoglobin improved to 10.9. Ferritin levels still low, will proceed with IV iron once approved for  sponsorship. Patient notified via interpreting service.

## 2025-04-30 ENCOUNTER — OFFICE VISIT (OUTPATIENT)
Age: 45
End: 2025-04-30

## 2025-04-30 DIAGNOSIS — M47.816 LUMBAR FACET ARTHROPATHY: ICD-10-CM

## 2025-04-30 DIAGNOSIS — M51.360 DEGENERATION OF INTERVERTEBRAL DISC OF LUMBAR REGION WITH DISCOGENIC BACK PAIN: Primary | ICD-10-CM

## 2025-04-30 PROCEDURE — 99214 OFFICE O/P EST MOD 30 MIN: CPT | Performed by: NURSE PRACTITIONER

## 2025-04-30 RX ORDER — METHOCARBAMOL 750 MG/1
750 TABLET, FILM COATED ORAL 3 TIMES DAILY PRN
Qty: 90 TABLET | Refills: 3 | Status: SHIPPED | OUTPATIENT
Start: 2025-04-30 | End: 2025-08-28

## 2025-04-30 RX ORDER — METHOCARBAMOL 750 MG/1
750 TABLET, FILM COATED ORAL 3 TIMES DAILY PRN
Qty: 90 TABLET | Refills: 0 | Status: SHIPPED | OUTPATIENT
Start: 2025-04-30 | End: 2025-04-30

## 2025-04-30 NOTE — PROGRESS NOTES
Name: Bulmaro Borja  YOB: 1980  Gender: male  MRN: [id number]    CC: Follow-up back pain    HPI: This is a 44 y.o. year old male who is predominantly [place] citizen-speaking.   service was provided today's appointment.  He has low back pain for quite some time.  He fell off a ladder about 3 years ago.  Pain is gotten more intense over the past 6 months.  He thought it was a kidney stone, urology found it to be nonobstructing kidney stone gave him diclofenac which ended up giving him a GI bleed.  He is currently taking Flexeril.  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  Pain can be intermittent and does interfere with his daily activities.  At last visit I prescribed Robaxin instead of the Flexeril and lidocaine patches.  He has found this helpful.  He had some x-rays that revealed degenerative disc disease and mild spondylosis.  He is also tried chiropractic care.  At last visit he obtained an MRI of the lumbar spine.  He is also applied for hospital sponsorship.             4/22/2025     9:28 AM   AMB PAIN ASSESSMENT   Location of Pain Back   Severity of Pain 2   Limiting Behavior Yes   Result of Injury No   Work-Related Injury No            No Known Allergies    Current Outpatient Medications:     methocarbamol (ROBAXIN-750) 750 MG tablet, Take 1 tablet by mouth 3 times daily as needed (for muscle spasm), Disp: 90 tablet, Rfl: 3    lidocaine 4 % external patch, Place 1 patch onto the skin daily (Patient not taking: Reported on 4/25/2025), Disp: 30 patch, Rfl: 0    pantoprazole (PROTONIX) 40 MG tablet, Take 1 tablet by mouth 2 times daily (before meals), Disp: 60 tablet, Rfl: 0    ferrous sulfate (IRON 325) 325 (65 Fe) MG tablet, Take 1 tablet by mouth daily (with breakfast), Disp: 90 tablet, Rfl: 1  Past Medical History:   Diagnosis Date    Kidney stone      Tobacco:  reports that he has never smoked. He has never used smokeless tobacco.  Alcohol:

## 2025-05-01 LAB — METHYLMALONATE SERPL-SCNC: 128 NMOL/L (ref 0–378)

## 2025-05-07 PROBLEM — Z12.11 COLON CANCER SCREENING: Status: RESOLVED | Noted: 2025-04-07 | Resolved: 2025-05-07

## 2025-05-09 ENCOUNTER — TELEPHONE (OUTPATIENT)
Age: 45
End: 2025-05-09

## 2025-05-09 DIAGNOSIS — D64.9 ANEMIA, UNSPECIFIED TYPE: Primary | ICD-10-CM

## 2025-05-09 NOTE — TELEPHONE ENCOUNTER
Used  Services and Reviewed result with patient per .     \"Please let the patient know that the capsule study is unremarkable without signs for any bleeding. Recommend taking oral iron for 3 months total twice a day with orange juice and lets recheck iron studies in July. If ongoing issues with bleeding then we need to consider repeat endoscopy. Can you please a referral to Hematology as well for anemia, negative EGD, colon, and capsule endoscopy. Patient should avoid all NSAIDS. Thanks! -Ross \"    Informed patient of Iron pills to start taking twice daily for 3 months then check labs. Referral placed to Hematology. Patient will follow up as scheduled in office.

## 2025-05-12 ENCOUNTER — TELEPHONE (OUTPATIENT)
Dept: GASTROENTEROLOGY | Age: 45
End: 2025-05-12

## 2025-05-12 RX ORDER — PANTOPRAZOLE SODIUM 40 MG/1
40 TABLET, DELAYED RELEASE ORAL
Qty: 60 TABLET | Refills: 0 | Status: SHIPPED | OUTPATIENT
Start: 2025-05-12

## 2025-05-12 NOTE — TELEPHONE ENCOUNTER
Pt contacted office and wanted a RTC with .  He was called back with assistance of interpreting services.  Pt asked if he could have a refill on his pantoprazole.      That Rx was sent.  He also had a question with regards to his infusion appt upcoming, and was provided that number to call them.

## 2025-05-16 ENCOUNTER — HOSPITAL ENCOUNTER (OUTPATIENT)
Dept: INFUSION THERAPY | Age: 45
Setting detail: INFUSION SERIES
Discharge: HOME | End: 2025-05-16

## 2025-05-16 VITALS
DIASTOLIC BLOOD PRESSURE: 85 MMHG | HEART RATE: 57 BPM | SYSTOLIC BLOOD PRESSURE: 135 MMHG | RESPIRATION RATE: 16 BRPM | OXYGEN SATURATION: 99 % | TEMPERATURE: 98 F

## 2025-05-16 DIAGNOSIS — D50.0 IRON DEFICIENCY ANEMIA DUE TO CHRONIC BLOOD LOSS: Primary | ICD-10-CM

## 2025-05-16 PROCEDURE — 2500000003 HC RX 250 WO HCPCS: Performed by: INTERNAL MEDICINE

## 2025-05-16 PROCEDURE — 96365 THER/PROPH/DIAG IV INF INIT: CPT

## 2025-05-16 PROCEDURE — 6360000002 HC RX W HCPCS: Performed by: INTERNAL MEDICINE

## 2025-05-16 PROCEDURE — 2580000003 HC RX 258: Performed by: INTERNAL MEDICINE

## 2025-05-16 RX ORDER — HYDROCORTISONE SODIUM SUCCINATE 100 MG/2ML
100 INJECTION INTRAMUSCULAR; INTRAVENOUS
Status: DISCONTINUED | OUTPATIENT
Start: 2025-05-16 | End: 2025-05-17 | Stop reason: HOSPADM

## 2025-05-16 RX ORDER — SODIUM CHLORIDE 9 MG/ML
INJECTION, SOLUTION INTRAVENOUS CONTINUOUS
Status: CANCELLED | OUTPATIENT
Start: 2025-05-23

## 2025-05-16 RX ORDER — ONDANSETRON 2 MG/ML
8 INJECTION INTRAMUSCULAR; INTRAVENOUS
Status: CANCELLED | OUTPATIENT
Start: 2025-05-23

## 2025-05-16 RX ORDER — SODIUM CHLORIDE 0.9 % (FLUSH) 0.9 %
5-40 SYRINGE (ML) INJECTION PRN
Status: CANCELLED | OUTPATIENT
Start: 2025-05-23

## 2025-05-16 RX ORDER — EPINEPHRINE 1 MG/ML
0.3 INJECTION, SOLUTION, CONCENTRATE INTRAVENOUS PRN
Status: DISCONTINUED | OUTPATIENT
Start: 2025-05-16 | End: 2025-05-17 | Stop reason: HOSPADM

## 2025-05-16 RX ORDER — SODIUM CHLORIDE 0.9 % (FLUSH) 0.9 %
5-40 SYRINGE (ML) INJECTION PRN
Status: DISCONTINUED | OUTPATIENT
Start: 2025-05-16 | End: 2025-05-17 | Stop reason: HOSPADM

## 2025-05-16 RX ORDER — EPINEPHRINE 1 MG/ML
0.3 INJECTION, SOLUTION, CONCENTRATE INTRAVENOUS PRN
Status: CANCELLED | OUTPATIENT
Start: 2025-05-23

## 2025-05-16 RX ORDER — SODIUM CHLORIDE 9 MG/ML
5-250 INJECTION, SOLUTION INTRAVENOUS PRN
Status: CANCELLED | OUTPATIENT
Start: 2025-05-23

## 2025-05-16 RX ORDER — HYDROCORTISONE SODIUM SUCCINATE 100 MG/2ML
100 INJECTION INTRAMUSCULAR; INTRAVENOUS
Status: CANCELLED | OUTPATIENT
Start: 2025-05-23

## 2025-05-16 RX ORDER — ACETAMINOPHEN 325 MG/1
650 TABLET ORAL
Status: CANCELLED | OUTPATIENT
Start: 2025-05-23

## 2025-05-16 RX ORDER — DIPHENHYDRAMINE HYDROCHLORIDE 50 MG/ML
50 INJECTION, SOLUTION INTRAMUSCULAR; INTRAVENOUS
Status: CANCELLED | OUTPATIENT
Start: 2025-05-23

## 2025-05-16 RX ORDER — ONDANSETRON 2 MG/ML
8 INJECTION INTRAMUSCULAR; INTRAVENOUS
Status: DISCONTINUED | OUTPATIENT
Start: 2025-05-16 | End: 2025-05-17 | Stop reason: HOSPADM

## 2025-05-16 RX ORDER — HEPARIN 100 UNIT/ML
500 SYRINGE INTRAVENOUS PRN
Status: CANCELLED | OUTPATIENT
Start: 2025-05-23

## 2025-05-16 RX ORDER — ALBUTEROL SULFATE 90 UG/1
4 INHALANT RESPIRATORY (INHALATION) PRN
Status: DISCONTINUED | OUTPATIENT
Start: 2025-05-16 | End: 2025-05-17 | Stop reason: HOSPADM

## 2025-05-16 RX ORDER — DIPHENHYDRAMINE HYDROCHLORIDE 50 MG/ML
50 INJECTION, SOLUTION INTRAMUSCULAR; INTRAVENOUS
Status: DISCONTINUED | OUTPATIENT
Start: 2025-05-16 | End: 2025-05-17 | Stop reason: HOSPADM

## 2025-05-16 RX ORDER — SODIUM CHLORIDE 9 MG/ML
INJECTION, SOLUTION INTRAVENOUS CONTINUOUS
Status: DISCONTINUED | OUTPATIENT
Start: 2025-05-16 | End: 2025-05-17 | Stop reason: HOSPADM

## 2025-05-16 RX ORDER — SODIUM CHLORIDE 9 MG/ML
5-250 INJECTION, SOLUTION INTRAVENOUS PRN
Status: DISCONTINUED | OUTPATIENT
Start: 2025-05-16 | End: 2025-05-17 | Stop reason: HOSPADM

## 2025-05-16 RX ORDER — ACETAMINOPHEN 325 MG/1
650 TABLET ORAL
Status: DISCONTINUED | OUTPATIENT
Start: 2025-05-16 | End: 2025-05-17 | Stop reason: HOSPADM

## 2025-05-16 RX ORDER — ALBUTEROL SULFATE 90 UG/1
4 INHALANT RESPIRATORY (INHALATION) PRN
Status: CANCELLED | OUTPATIENT
Start: 2025-05-23

## 2025-05-16 RX ADMIN — SODIUM CHLORIDE, PRESERVATIVE FREE 10 ML: 5 INJECTION INTRAVENOUS at 15:35

## 2025-05-16 RX ADMIN — FERRIC CARBOXYMALTOSE INJECTION 750 MG: 50 INJECTION, SOLUTION INTRAVENOUS at 14:44

## 2025-05-16 RX ADMIN — SODIUM CHLORIDE, PRESERVATIVE FREE 10 ML: 5 INJECTION INTRAVENOUS at 14:00

## 2025-05-16 RX ADMIN — SODIUM CHLORIDE 100 ML/HR: 0.9 INJECTION, SOLUTION INTRAVENOUS at 14:40

## 2025-05-16 NOTE — PROGRESS NOTES
Arrived to the Infusion Center.  Injectafer completed. Patient tolerated without complication.   Any issues or concerns during appointment: No.  Patient aware of next infusion appointment on 05/23/25 (date) at 1415 (time).  Patient instructed to call provider with temperature of 100.4 or greater or nausea/vomiting/ diarrhea or pain not controlled by medications  Discharged ambulatory.

## 2025-05-23 ENCOUNTER — HOSPITAL ENCOUNTER (OUTPATIENT)
Dept: INFUSION THERAPY | Age: 45
Setting detail: INFUSION SERIES
Discharge: HOME | End: 2025-05-23

## 2025-05-23 VITALS
SYSTOLIC BLOOD PRESSURE: 138 MMHG | HEART RATE: 66 BPM | TEMPERATURE: 98 F | RESPIRATION RATE: 17 BRPM | WEIGHT: 190.6 LBS | DIASTOLIC BLOOD PRESSURE: 82 MMHG | BODY MASS INDEX: 29.85 KG/M2

## 2025-05-23 DIAGNOSIS — D50.0 IRON DEFICIENCY ANEMIA DUE TO CHRONIC BLOOD LOSS: Primary | ICD-10-CM

## 2025-05-23 PROCEDURE — 2580000003 HC RX 258: Performed by: INTERNAL MEDICINE

## 2025-05-23 PROCEDURE — 6360000002 HC RX W HCPCS: Performed by: INTERNAL MEDICINE

## 2025-05-23 PROCEDURE — 2500000003 HC RX 250 WO HCPCS: Performed by: INTERNAL MEDICINE

## 2025-05-23 PROCEDURE — 96365 THER/PROPH/DIAG IV INF INIT: CPT

## 2025-05-23 RX ORDER — SODIUM CHLORIDE 0.9 % (FLUSH) 0.9 %
5-40 SYRINGE (ML) INJECTION PRN
OUTPATIENT
Start: 2025-05-23

## 2025-05-23 RX ORDER — EPINEPHRINE 1 MG/ML
0.3 INJECTION, SOLUTION, CONCENTRATE INTRAVENOUS PRN
OUTPATIENT
Start: 2025-05-23

## 2025-05-23 RX ORDER — SODIUM CHLORIDE 0.9 % (FLUSH) 0.9 %
5-40 SYRINGE (ML) INJECTION PRN
Status: DISCONTINUED | OUTPATIENT
Start: 2025-05-23 | End: 2025-05-24 | Stop reason: HOSPADM

## 2025-05-23 RX ORDER — DIPHENHYDRAMINE HYDROCHLORIDE 50 MG/ML
50 INJECTION, SOLUTION INTRAMUSCULAR; INTRAVENOUS
OUTPATIENT
Start: 2025-05-23

## 2025-05-23 RX ORDER — SODIUM CHLORIDE 9 MG/ML
5-250 INJECTION, SOLUTION INTRAVENOUS PRN
OUTPATIENT
Start: 2025-05-23

## 2025-05-23 RX ORDER — HEPARIN 100 UNIT/ML
500 SYRINGE INTRAVENOUS PRN
OUTPATIENT
Start: 2025-05-23

## 2025-05-23 RX ORDER — ONDANSETRON 2 MG/ML
8 INJECTION INTRAMUSCULAR; INTRAVENOUS
OUTPATIENT
Start: 2025-05-23

## 2025-05-23 RX ORDER — SODIUM CHLORIDE 9 MG/ML
INJECTION, SOLUTION INTRAVENOUS CONTINUOUS
OUTPATIENT
Start: 2025-05-23

## 2025-05-23 RX ORDER — ACETAMINOPHEN 325 MG/1
650 TABLET ORAL
OUTPATIENT
Start: 2025-05-23

## 2025-05-23 RX ORDER — ALBUTEROL SULFATE 90 UG/1
4 INHALANT RESPIRATORY (INHALATION) PRN
OUTPATIENT
Start: 2025-05-23

## 2025-05-23 RX ORDER — HYDROCORTISONE SODIUM SUCCINATE 100 MG/2ML
100 INJECTION INTRAMUSCULAR; INTRAVENOUS
OUTPATIENT
Start: 2025-05-23

## 2025-05-23 RX ORDER — SODIUM CHLORIDE 9 MG/ML
5-250 INJECTION, SOLUTION INTRAVENOUS PRN
Status: CANCELLED | OUTPATIENT
Start: 2025-05-23

## 2025-05-23 RX ORDER — SODIUM CHLORIDE 9 MG/ML
5-250 INJECTION, SOLUTION INTRAVENOUS PRN
Status: DISCONTINUED | OUTPATIENT
Start: 2025-05-23 | End: 2025-05-24 | Stop reason: HOSPADM

## 2025-05-23 RX ADMIN — FERRIC CARBOXYMALTOSE INJECTION 750 MG: 50 INJECTION, SOLUTION INTRAVENOUS at 14:39

## 2025-05-23 RX ADMIN — SODIUM CHLORIDE, PRESERVATIVE FREE 10 ML: 5 INJECTION INTRAVENOUS at 14:18

## 2025-05-23 NOTE — PROGRESS NOTES
Arrived to the Infusion Center. Orders reviewed; Injectafer infusion completed. Patient tolerated well.   Any issues or concerns during appointment: none. Patient observed for 30 mins post-infusion, no adverse reactions noted.   Patient aware of next lab and [facility] office visit on 9/25/2025 (date) at 1300 (time).  Patient instructed to call provider with temperature of 100.4 or greater or nausea/vomiting/ diarrhea or pain not controlled by medications  Discharged ambulatory.

## 2025-06-09 ENCOUNTER — OFFICE VISIT (OUTPATIENT)
Dept: GASTROENTEROLOGY | Age: 45
End: 2025-06-09

## 2025-06-09 VITALS
RESPIRATION RATE: 18 BRPM | OXYGEN SATURATION: 96 % | DIASTOLIC BLOOD PRESSURE: 82 MMHG | HEART RATE: 86 BPM | SYSTOLIC BLOOD PRESSURE: 125 MMHG | WEIGHT: 187 LBS | BODY MASS INDEX: 29.35 KG/M2 | HEIGHT: 67 IN

## 2025-06-09 DIAGNOSIS — D50.0 IRON DEFICIENCY ANEMIA DUE TO CHRONIC BLOOD LOSS: Primary | ICD-10-CM

## 2025-06-09 PROCEDURE — 99213 OFFICE O/P EST LOW 20 MIN: CPT

## 2025-06-09 RX ORDER — PANTOPRAZOLE SODIUM 40 MG/1
80 TABLET, DELAYED RELEASE ORAL
Qty: 60 TABLET | Refills: 0 | Status: SHIPPED | OUTPATIENT
Start: 2025-06-09

## 2025-06-09 NOTE — PROGRESS NOTES
GASTROENTEROLOGY CLINIC VISIT      CC: Follow up on JEFFREY    HPI:   Bulmaro Borja is 44 y.o. y/o male established patient who presents to the office for follow up. He saw me in April for melena as well as iron deficiency anemia. His EGD/colonoscopy was normal. He did the VCE which was also normal with no bleeding. He is following hematology for the JEFFREY and received 2 IV iron infusions. He is feeling much better since receiving those. He denies any more bloody or black stools. He denies any nausea, vomiting, abdominal pain, dizziness, or shortness of breath or constipation. He has changed his diet as well and is eating healthier. He did experience some chest pain during the IV iron infusions which I explained to him was probably a reaction to the medication. He is still taking his oral iron once daily and he has a lab follow up at the end of July.       PMH/PSH:   Acute GI bleed, anemia, JEFFREY    FMH:   Denies FMH gastric or colorectal cancer   Denies FMH autoimmune disease     SocHx:   Denies smoking  Denies alcohol  Denies illicit drug use    PE:  /82, Pulse 86, Resp 18, SpO2% 96    Physical Exam:   General appearance - alert, well appearing, and in no distress and oriented to person, place, and time  Mental status - alert, oriented to person, place, and time, normal mood, behavior, speech, dress, motor activity, and thought processes  Abdomen -abdomen is soft without significant tenderness, masses, organomegaly or guarding.  Rectal exam-: deferred, not clinically indicated  Neurologic-Grossly normal:    LABS:   Lab Results   Component Value Date    HGB 10.9 (L) 04/25/2025    WBC 6.8 04/25/2025     04/25/2025    MCV 81.1 (L) 04/25/2025    IRON 120 (H) 04/25/2025    FERRITIN 67 04/25/2025    TIBC 390 04/25/2025    CREATININE 0.94 04/25/2025    ALT 30 04/25/2025    AST 25 04/25/2025       ROS:   Review of Systems - History obtained from the patient  General ROS:

## 2025-06-17 ENCOUNTER — OFFICE VISIT (OUTPATIENT)
Dept: ORTHOPEDIC SURGERY | Age: 45
End: 2025-06-17

## 2025-06-17 VITALS — WEIGHT: 187 LBS | HEIGHT: 67 IN | BODY MASS INDEX: 29.35 KG/M2

## 2025-06-17 DIAGNOSIS — M47.816 LUMBAR FACET ARTHROPATHY: Primary | ICD-10-CM

## 2025-06-17 PROCEDURE — 99203 OFFICE O/P NEW LOW 30 MIN: CPT | Performed by: PHYSICIAN ASSISTANT

## 2025-06-17 NOTE — PROGRESS NOTES
Name: Bulmaro Borja  YOB: 1980  Gender: male  MRN: [id number]    CC:   Chief Complaint   Patient presents with    New Patient     Lumbar spine         HPI:   History of Present Illness  The patient is a 44-year-old male who presents today with lower back pain and related problems. He was referred by Curtis Ruelas, nurse practitioner of the orthopedic spine surgery service for evaluation of lower back pain. He underwent a lumbar MRI on 04/26/2025 with results listed below. He presents here and history obtained is by the use of a .    He reports an improvement in his back pain, attributing it to the prescribed medication regimen initiated a month ago, robaxin. He takes one pill in the morning and one in the evening, which has enabled him to walk and stand without discomfort or anxiety. He does not experience any current pain or tingling sensations. He also reports no numbness in his legs or feet, and there is no radiating pain down his legs. He recalls a previous incident where his back pain was misattributed to a kidney issue, but further investigation revealed it was due to a fall from a ladder. He has recently resumed work after receiving iron injections and is now seeking to return to his job. He does not have a primary care physician. He was previously referred to this clinic for follow-up on his kidney stone and potential surgical intervention. He has a history of kidney stones and finds that his back pain subsides upon taking medication, leading him to believe it can be managed with the current treatment plan.    He has been under the care of a urologist for an extended period due to a large kidney stone that requires surgical removal. Despite the presence of the stone, he reports no associated pain. He was informed by his urologist that once the stone begins to pass, surgical intervention will be necessary.    He has experienced blood loss on three occasions,

## 2025-07-07 RX ORDER — PANTOPRAZOLE SODIUM 40 MG/1
80 TABLET, DELAYED RELEASE ORAL
Qty: 60 TABLET | Refills: 0 | Status: SHIPPED | OUTPATIENT
Start: 2025-07-07

## 2025-07-21 ENCOUNTER — OFFICE VISIT (OUTPATIENT)
Dept: PRIMARY CARE CLINIC | Facility: CLINIC | Age: 45
End: 2025-07-21

## 2025-07-21 VITALS
DIASTOLIC BLOOD PRESSURE: 89 MMHG | WEIGHT: 184 LBS | HEIGHT: 67 IN | TEMPERATURE: 98.1 F | SYSTOLIC BLOOD PRESSURE: 140 MMHG | RESPIRATION RATE: 96 BRPM | HEART RATE: 67 BPM | BODY MASS INDEX: 28.88 KG/M2

## 2025-07-21 DIAGNOSIS — H61.21 IMPACTED CERUMEN OF RIGHT EAR: ICD-10-CM

## 2025-07-21 DIAGNOSIS — Z00.00 PHYSICAL EXAM, ANNUAL: Primary | ICD-10-CM

## 2025-07-21 DIAGNOSIS — M47.816 LUMBAR FACET ARTHROPATHY: ICD-10-CM

## 2025-07-21 DIAGNOSIS — N28.1 RENAL CYST: ICD-10-CM

## 2025-07-21 DIAGNOSIS — D50.0 IRON DEFICIENCY ANEMIA DUE TO CHRONIC BLOOD LOSS: ICD-10-CM

## 2025-07-21 DIAGNOSIS — N20.0 LEFT NEPHROLITHIASIS: ICD-10-CM

## 2025-07-21 DIAGNOSIS — Z13.6 ENCOUNTER FOR SCREENING FOR CARDIOVASCULAR DISORDERS: ICD-10-CM

## 2025-07-21 DIAGNOSIS — Z13.29 THYROID DISORDER SCREEN: ICD-10-CM

## 2025-07-21 DIAGNOSIS — Z11.4 ENCOUNTER FOR SCREENING FOR HIV: ICD-10-CM

## 2025-07-21 DIAGNOSIS — Z11.59 NEED FOR HEPATITIS C SCREENING TEST: ICD-10-CM

## 2025-07-21 DIAGNOSIS — Z23 NEED FOR TDAP VACCINATION: ICD-10-CM

## 2025-07-21 DIAGNOSIS — R03.0 ELEVATED BP WITHOUT DIAGNOSIS OF HYPERTENSION: ICD-10-CM

## 2025-07-21 DIAGNOSIS — Z63.0 MARITAL OR PARTNER RELATIONAL PROBLEM: ICD-10-CM

## 2025-07-21 DIAGNOSIS — Z87.19 HISTORY OF GI BLEED: ICD-10-CM

## 2025-07-21 PROBLEM — A60.00 GENITAL HERPES SIMPLEX: Status: ACTIVE | Noted: 2018-04-17

## 2025-07-21 PROBLEM — K92.2 GI BLEED: Status: RESOLVED | Noted: 2025-01-23 | Resolved: 2025-07-21

## 2025-07-21 PROBLEM — K92.1 MELENA: Status: RESOLVED | Noted: 2025-04-07 | Resolved: 2025-07-21

## 2025-07-21 PROBLEM — K92.2 ACUTE GI BLEEDING: Status: RESOLVED | Noted: 2025-04-10 | Resolved: 2025-07-21

## 2025-07-21 PROBLEM — K92.0 HEMATEMESIS: Status: RESOLVED | Noted: 2025-01-23 | Resolved: 2025-07-21

## 2025-07-21 PROBLEM — E78.01 FAMILIAL HYPERCHOLESTEROLEMIA: Status: ACTIVE | Noted: 2021-03-17

## 2025-07-21 PROCEDURE — 69210 REMOVE IMPACTED EAR WAX UNI: CPT | Performed by: NURSE PRACTITIONER

## 2025-07-21 PROCEDURE — 99396 PREV VISIT EST AGE 40-64: CPT | Performed by: NURSE PRACTITIONER

## 2025-07-21 PROCEDURE — 90471 IMMUNIZATION ADMIN: CPT | Performed by: NURSE PRACTITIONER

## 2025-07-21 PROCEDURE — 90715 TDAP VACCINE 7 YRS/> IM: CPT | Performed by: NURSE PRACTITIONER

## 2025-07-21 PROCEDURE — 99213 OFFICE O/P EST LOW 20 MIN: CPT | Performed by: NURSE PRACTITIONER

## 2025-07-21 SDOH — SOCIAL STABILITY - SOCIAL INSECURITY: PROBLEMS IN RELATIONSHIP WITH SPOUSE OR PARTNER: Z63.0

## 2025-07-21 SDOH — ECONOMIC STABILITY: FOOD INSECURITY: WITHIN THE PAST 12 MONTHS, YOU WORRIED THAT YOUR FOOD WOULD RUN OUT BEFORE YOU GOT MONEY TO BUY MORE.: NEVER TRUE

## 2025-07-21 SDOH — ECONOMIC STABILITY: FOOD INSECURITY: WITHIN THE PAST 12 MONTHS, THE FOOD YOU BOUGHT JUST DIDN'T LAST AND YOU DIDN'T HAVE MONEY TO GET MORE.: NEVER TRUE

## 2025-07-21 ASSESSMENT — ENCOUNTER SYMPTOMS
CONSTIPATION: 0
COUGH: 0
SORE THROAT: 0
NAUSEA: 0
DIARRHEA: 0
ABDOMINAL PAIN: 0
WHEEZING: 0
VOMITING: 0
SHORTNESS OF BREATH: 0
BLOOD IN STOOL: 0
CHEST TIGHTNESS: 0

## 2025-07-21 NOTE — PROGRESS NOTES
Bulmaro Borja (:  1980) is a 44 y.o. male here for evaluation of the following chief complaint(s):  Chief Complaint   Patient presents with    Annual Exam     Patient presents for a CME, patients states he went to see Ortho to possibly get an injection but he did not get it, he doesn't understand why since his pain is not being cause by the kidney stone, he has financial assistance until .  Wondering if PT would help.      Reviewed and updated this visit by provider:  Tobacco  Allergies  Meds  Problems  Med Hx  Surg Hx  Fam Hx         HPI   Interpretation provided by [facility]     Patient presents today for CPE and follow up.     Back pain- Has seen orthopedics, last visit 25. Has experienced pain control with Robaxin. Rates pain a 3/10. 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 Pain occurs intermittently. Desires injection to back for pain but states pain has not worsened. Per orthopedic note if pain worsens can consider facet injections. Pt also with kidney stones and renal cyst follows w/ urology scheduled for NIESHA and KUB. CT 3/2025 showed 5mm stone in left renal calyx and right kidney with 12 mm cortical lesion. Had GI bleed was hospitalized in 2025 for this and follows with GI and hematology. Had two iron transfusions in 2025 and felt his \"strength return\". Still feels not as strong as he used to be. Last Hgb 10.9. Rides bike 3-4 hrs every week for exercise.     Immunizations   Last tetanus- many years ago  Flu vaccine- receives annually    Immunizations:  Immunization status: up to date and documented.    Review of Systems:   Review of Systems   Constitutional:  Negative for chills, fever and unexpected weight change.   HENT:  Negative for ear pain, hearing loss and sore throat.    Eyes:  Negative for visual disturbance.   Respiratory:  Negative for cough, chest tightness, shortness of breath and wheezing.    Cardiovascular:  Negative for chest pain,

## 2025-07-21 NOTE — CONSULTS
Session ID: [id number]  Session Duration: Longer than 54 minutes  Language: Greenlandic   ID: #[id number]   Name: Anh

## 2025-07-25 ENCOUNTER — HOSPITAL ENCOUNTER (OUTPATIENT)
Dept: LAB | Age: 45
Discharge: HOME | End: 2025-07-25

## 2025-07-25 DIAGNOSIS — D50.0 IRON DEFICIENCY ANEMIA DUE TO CHRONIC BLOOD LOSS: ICD-10-CM

## 2025-07-25 LAB
BASOPHILS # BLD: 0.03 K/UL (ref 0–0.2)
BASOPHILS NFR BLD: 0.5 % (ref 0–2)
DIFFERENTIAL METHOD BLD: ABNORMAL
EOSINOPHIL # BLD: 0.04 K/UL (ref 0–0.8)
EOSINOPHIL NFR BLD: 0.7 % (ref 0.5–7.8)
ERYTHROCYTE [DISTWIDTH] IN BLOOD BY AUTOMATED COUNT: 16.9 % (ref 11.9–14.6)
FERRITIN SERPL-MCNC: 423 NG/ML (ref 8–388)
HCT VFR BLD AUTO: 47.9 % (ref 41.1–50.3)
HGB BLD-MCNC: 15.8 G/DL (ref 13.6–17.2)
IMM GRANULOCYTES # BLD AUTO: 0.02 K/UL (ref 0–0.5)
IMM GRANULOCYTES NFR BLD AUTO: 0.3 % (ref 0–5)
IRON SATN MFR SERPL: 32 % (ref 20–50)
IRON SERPL-MCNC: 85 UG/DL (ref 35–100)
LYMPHOCYTES # BLD: 1.61 K/UL (ref 0.5–4.6)
LYMPHOCYTES NFR BLD: 26.7 % (ref 13–44)
MCH RBC QN AUTO: 27.5 PG (ref 26.1–32.9)
MCHC RBC AUTO-ENTMCNC: 33 G/DL (ref 31.4–35)
MCV RBC AUTO: 83.4 FL (ref 82–102)
MONOCYTES # BLD: 0.51 K/UL (ref 0.1–1.3)
MONOCYTES NFR BLD: 8.5 % (ref 4–12)
NEUTS SEG # BLD: 3.82 K/UL (ref 1.7–8.2)
NEUTS SEG NFR BLD: 63.3 % (ref 43–78)
NRBC # BLD: 0 K/UL (ref 0–0.2)
PLATELET # BLD AUTO: 252 K/UL (ref 150–450)
PMV BLD AUTO: 8.8 FL (ref 9.4–12.3)
RBC # BLD AUTO: 5.74 M/UL (ref 4.23–5.6)
TIBC SERPL-MCNC: 265 UG/DL (ref 240–450)
UIBC SERPL-MCNC: 180 UG/DL (ref 112–347)
WBC # BLD AUTO: 6 K/UL (ref 4.3–11.1)

## 2025-07-25 PROCEDURE — 82728 ASSAY OF FERRITIN: CPT

## 2025-07-25 PROCEDURE — 83540 ASSAY OF IRON: CPT

## 2025-07-25 PROCEDURE — 85025 COMPLETE CBC W/AUTO DIFF WBC: CPT

## 2025-07-25 PROCEDURE — 36415 COLL VENOUS BLD VENIPUNCTURE: CPT

## 2025-07-25 PROCEDURE — 83550 IRON BINDING TEST: CPT

## 2025-07-29 ENCOUNTER — TELEPHONE (OUTPATIENT)
Dept: ONCOLOGY | Age: 45
End: 2025-07-29

## 2025-07-29 NOTE — TELEPHONE ENCOUNTER
Labs reviewed by Dr. Laguerre, no need for further iron at this time, follow up as scheduled. Call to patient via . Patient verbalized understanding.

## 2025-08-04 RX ORDER — PANTOPRAZOLE SODIUM 40 MG/1
80 TABLET, DELAYED RELEASE ORAL
Qty: 180 TABLET | Refills: 1 | Status: SHIPPED | OUTPATIENT
Start: 2025-08-04

## (undated) DEVICE — GAUZE,SPONGE,4"X4",12PLY,WOVEN,NS,LF: Brand: MEDLINE

## (undated) DEVICE — KENDALL RADIOLUCENT FOAM MONITORING ELECTRODE RECTANGULAR SHAPE: Brand: KENDALL

## (undated) DEVICE — NEEDLE SYRINGE 18GA L1.5IN RED PLAS HUB S STL BLNT FILL W/O

## (undated) DEVICE — SYR 3ML LL TIP 1/10ML GRAD --

## (undated) DEVICE — AIRLIFE™ OXYGEN TUBING 7 FEET (2.1 M) CRUSH RESISTANT OXYGEN TUBING, VINYL TIPPED: Brand: AIRLIFE™

## (undated) DEVICE — BALLOON US E LIN RNG O KT FOR FG-32UA

## (undated) DEVICE — ENDOSCOPIC KIT 1.1+ OP4 CA DE 2 GWN AAMI LEVEL 3

## (undated) DEVICE — SYRINGE MEDICAL 3ML CLEAR PLASTIC STANDARD NON CONTROL LUERLOCK TIP DISPOSABLE

## (undated) DEVICE — Z DISCONTINUED CANNULA NSL ORAL AD FOR CAPNOFLEX CO2 O2 AIRLFE

## (undated) DEVICE — DISPOSABLE BIOPSY VALVE MAJ-1555: Brand: SINGLE USE BIOPSY VALVE (STERILE)

## (undated) DEVICE — CONNECTOR TBNG OD5-7MM O2 END DISP

## (undated) DEVICE — YANKAUER,BULB TIP,W/O VENT,RIGID,STERILE: Brand: MEDLINE

## (undated) DEVICE — BLOCK BITE AD 60FR W/ VELC STRP ADDRESSES MOST PT AND

## (undated) DEVICE — NDL PRT INJ NSAF BLNT 18GX1.5 --

## (undated) DEVICE — SYR 5ML 1/5 GRAD LL NSAF LF --

## (undated) DEVICE — SINGLE PORT MANIFOLD: Brand: NEPTUNE 2

## (undated) DEVICE — MOUTHPIECE ENDOSCP L CTRL OPN AND SIDE PORTS DISP

## (undated) DEVICE — CANNULA NSL ORAL AD FOR CAPNOFLEX CO2 O2 AIRLFE

## (undated) DEVICE — GARMENT,MEDLINE,DVT,INT,CALF,LG, GEN2: Brand: MEDLINE

## (undated) DEVICE — SYRINGE MED 10ML LUERLOCK TIP W/O SFTY DISP